# Patient Record
Sex: FEMALE | Race: WHITE | Employment: OTHER
[De-identification: names, ages, dates, MRNs, and addresses within clinical notes are randomized per-mention and may not be internally consistent; named-entity substitution may affect disease eponyms.]

---

## 2017-02-08 ENCOUNTER — TELEPHONE (OUTPATIENT)
Dept: UROLOGY | Facility: CLINIC | Age: 57
End: 2017-02-08

## 2017-02-09 ENCOUNTER — OFFICE VISIT (OUTPATIENT)
Dept: UROLOGY | Facility: CLINIC | Age: 57
End: 2017-02-09

## 2017-02-09 VITALS
TEMPERATURE: 98.2 F | HEIGHT: 57 IN | BODY MASS INDEX: 50.27 KG/M2 | WEIGHT: 233 LBS | HEART RATE: 67 BPM | DIASTOLIC BLOOD PRESSURE: 77 MMHG | SYSTOLIC BLOOD PRESSURE: 127 MMHG

## 2017-02-09 DIAGNOSIS — R35.0 FREQUENCY OF MICTURITION: ICD-10-CM

## 2017-02-09 DIAGNOSIS — N39.46 MIXED STRESS AND URGE URINARY INCONTINENCE: Primary | ICD-10-CM

## 2017-02-09 LAB
BILIRUBIN, POC: NORMAL
BLOOD URINE, POC: NORMAL
CLARITY, POC: CLEAR
COLOR, POC: YELLOW
GLUCOSE URINE, POC: NORMAL
KETONES, POC: NORMAL
LEUKOCYTE EST, POC: NORMAL
NITRITE, POC: NORMAL
PH, POC: 6.5
PROTEIN, POC: NORMAL
SPECIFIC GRAVITY, POC: 1.01
UROBILINOGEN, POC: 0.2

## 2017-02-09 PROCEDURE — 81002 URINALYSIS NONAUTO W/O SCOPE: CPT | Performed by: SPECIALIST

## 2017-02-09 PROCEDURE — 99203 OFFICE O/P NEW LOW 30 MIN: CPT | Performed by: SPECIALIST

## 2017-02-09 RX ORDER — OXYBUTYNIN CHLORIDE 10 MG/1
10 TABLET, EXTENDED RELEASE ORAL 2 TIMES DAILY
Qty: 60 TABLET | Refills: 3 | Status: SHIPPED | OUTPATIENT
Start: 2017-02-09 | End: 2017-02-10 | Stop reason: SDUPTHER

## 2017-02-09 RX ORDER — HYDROCHLOROTHIAZIDE 25 MG/1
25 TABLET ORAL DAILY
COMMUNITY
Start: 2016-12-14

## 2017-02-10 DIAGNOSIS — R35.0 FREQUENCY OF MICTURITION: ICD-10-CM

## 2017-02-10 DIAGNOSIS — N39.46 MIXED STRESS AND URGE URINARY INCONTINENCE: ICD-10-CM

## 2017-02-10 RX ORDER — OXYBUTYNIN CHLORIDE 10 MG/1
10 TABLET, EXTENDED RELEASE ORAL 2 TIMES DAILY
Qty: 60 TABLET | Refills: 3 | Status: SHIPPED | OUTPATIENT
Start: 2017-02-10

## 2017-03-09 ENCOUNTER — OFFICE VISIT (OUTPATIENT)
Dept: UROLOGY | Facility: CLINIC | Age: 57
End: 2017-03-09

## 2017-03-09 VITALS
SYSTOLIC BLOOD PRESSURE: 134 MMHG | HEART RATE: 62 BPM | DIASTOLIC BLOOD PRESSURE: 71 MMHG | HEIGHT: 57 IN | WEIGHT: 241 LBS | BODY MASS INDEX: 52 KG/M2

## 2017-03-09 DIAGNOSIS — N39.41 URGE INCONTINENCE: Primary | ICD-10-CM

## 2017-03-09 DIAGNOSIS — N39.3 SUI (STRESS URINARY INCONTINENCE, FEMALE): ICD-10-CM

## 2017-03-09 DIAGNOSIS — R35.0 URINARY FREQUENCY: ICD-10-CM

## 2017-03-09 DIAGNOSIS — N32.81 OVERACTIVE BLADDER: ICD-10-CM

## 2017-03-09 LAB
BILIRUBIN, POC: NORMAL
BLOOD URINE, POC: NORMAL
CLARITY, POC: CLEAR
COLOR, POC: YELLOW
GLUCOSE URINE, POC: NORMAL
KETONES, POC: NORMAL
LEUKOCYTE EST, POC: NORMAL
NITRITE, POC: NORMAL
PH, POC: NORMAL
PROTEIN, POC: NORMAL
SPECIFIC GRAVITY, POC: 1
UROBILINOGEN, POC: 0.2

## 2017-03-09 PROCEDURE — 99214 OFFICE O/P EST MOD 30 MIN: CPT | Performed by: UROLOGY

## 2017-03-09 PROCEDURE — 81002 URINALYSIS NONAUTO W/O SCOPE: CPT | Performed by: UROLOGY

## 2017-04-05 RX ORDER — ACETAMINOPHEN 325 MG/1
650 TABLET ORAL PRN
COMMUNITY

## 2017-04-06 ENCOUNTER — HOSPITAL ENCOUNTER (OUTPATIENT)
Age: 57
Setting detail: OUTPATIENT SURGERY
Discharge: HOME | End: 2017-04-06
Attending: SPECIALIST | Admitting: SPECIALIST
Payer: COMMERCIAL

## 2017-04-06 VITALS
HEART RATE: 65 BPM | HEIGHT: 57 IN | DIASTOLIC BLOOD PRESSURE: 60 MMHG | WEIGHT: 235 LBS | BODY MASS INDEX: 50.7 KG/M2 | OXYGEN SATURATION: 98 % | SYSTOLIC BLOOD PRESSURE: 124 MMHG | TEMPERATURE: 99 F | RESPIRATION RATE: 16 BRPM

## 2017-04-06 PROCEDURE — 3430000000 HC RX DIAGNOSTIC RADIOPHARMACEUTICAL: Performed by: SPECIALIST

## 2017-04-06 PROCEDURE — 7100000010 HC PHASE II RECOVERY - FIRST 15 MIN: Performed by: SPECIALIST

## 2017-04-06 PROCEDURE — 2500000003 HC RX 250 WO HCPCS: Performed by: SPECIALIST

## 2017-04-06 PROCEDURE — 7100000011 HC PHASE II RECOVERY - ADDTL 15 MIN: Performed by: SPECIALIST

## 2017-04-06 PROCEDURE — 3600000002 HC SURGERY LEVEL 2 BASE: Performed by: SPECIALIST

## 2017-04-06 PROCEDURE — 3600000012 HC SURGERY LEVEL 2 ADDTL 15MIN: Performed by: SPECIALIST

## 2017-04-06 PROCEDURE — 6370000000 HC RX 637 (ALT 250 FOR IP): Performed by: SPECIALIST

## 2017-04-06 RX ORDER — ULTRASOUND COUPLING MEDIUM
GEL (GRAM) TOPICAL PRN
Status: DISCONTINUED | OUTPATIENT
Start: 2017-04-06 | End: 2017-04-06 | Stop reason: HOSPADM

## 2017-04-06 RX ORDER — SODIUM CHLORIDE, SODIUM LACTATE, POTASSIUM CHLORIDE, CALCIUM CHLORIDE 600; 310; 30; 20 MG/100ML; MG/100ML; MG/100ML; MG/100ML
INJECTION, SOLUTION INTRAVENOUS CONTINUOUS
Status: DISCONTINUED | OUTPATIENT
Start: 2017-04-06 | End: 2017-04-06

## 2017-04-06 RX ORDER — WATER 1000 ML/1000ML
INJECTION, SOLUTION INTRAVENOUS PRN
Status: DISCONTINUED | OUTPATIENT
Start: 2017-04-06 | End: 2017-04-06 | Stop reason: HOSPADM

## 2017-04-06 RX ORDER — LIDOCAINE HYDROCHLORIDE 10 MG/ML
1 INJECTION, SOLUTION EPIDURAL; INFILTRATION; INTRACAUDAL; PERINEURAL
Status: DISCONTINUED | OUTPATIENT
Start: 2017-04-06 | End: 2017-04-06

## 2017-04-06 ASSESSMENT — PAIN SCALES - GENERAL: PAINLEVEL_OUTOF10: 0

## 2017-04-06 ASSESSMENT — PAIN - FUNCTIONAL ASSESSMENT: PAIN_FUNCTIONAL_ASSESSMENT: 0-10

## 2017-05-01 ENCOUNTER — TELEPHONE (OUTPATIENT)
Dept: UROLOGY | Age: 57
End: 2017-05-01

## 2017-05-24 ENCOUNTER — TELEPHONE (OUTPATIENT)
Dept: UROLOGY | Age: 57
End: 2017-05-24

## 2017-07-10 ENCOUNTER — TELEPHONE (OUTPATIENT)
Dept: OBGYN CLINIC | Age: 57
End: 2017-07-10

## 2017-07-10 DIAGNOSIS — Z12.31 VISIT FOR SCREENING MAMMOGRAM: Primary | ICD-10-CM

## 2018-01-31 DIAGNOSIS — Z12.31 VISIT FOR SCREENING MAMMOGRAM: Primary | ICD-10-CM

## 2018-11-28 ENCOUNTER — HOSPITAL ENCOUNTER (OUTPATIENT)
Age: 58
Setting detail: SPECIMEN
Discharge: HOME | End: 2018-11-28
Payer: MEDICARE

## 2018-11-28 ENCOUNTER — OFFICE VISIT (OUTPATIENT)
Dept: OBGYN CLINIC | Age: 58
End: 2018-11-28
Payer: MEDICARE

## 2018-11-28 VITALS — RESPIRATION RATE: 20 BRPM | WEIGHT: 249.8 LBS | HEIGHT: 57 IN | BODY MASS INDEX: 53.89 KG/M2

## 2018-11-28 DIAGNOSIS — Z01.419 VISIT FOR GYNECOLOGIC EXAMINATION: Primary | ICD-10-CM

## 2018-11-28 DIAGNOSIS — Z12.72 SCREENING FOR VAGINAL CANCER: ICD-10-CM

## 2018-11-28 DIAGNOSIS — Z90.710 STATUS POST HYSTERECTOMY: ICD-10-CM

## 2018-11-28 PROCEDURE — G0101 CA SCREEN;PELVIC/BREAST EXAM: HCPCS | Performed by: SPECIALIST

## 2018-11-28 PROCEDURE — G8484 FLU IMMUNIZE NO ADMIN: HCPCS | Performed by: SPECIALIST

## 2018-11-28 ASSESSMENT — ENCOUNTER SYMPTOMS
CONSTIPATION: 0
VOMITING: 0
ABDOMINAL PAIN: 0
EYE PAIN: 0
APNEA: 0
NAUSEA: 0
ABDOMINAL DISTENTION: 0
COUGH: 0
DIARRHEA: 0

## 2018-11-28 NOTE — PROGRESS NOTES
 PAP SMEAR     Patient History:    No LMP recorded (approximate). Patient has had a hysterectomy. OBGYN Status: Hysterectomy  Past Surgical History:  1988: APPENDECTOMY  4/6/2017: CYSTOMETROGRAM N/A      Comment: VIDEOURDYNAMICS performed by Mike Harvey MD at [place] [address]  No date: CYSTOSCOPY      Comment: with urodynamics  4/6/2017: CYSTOSCOPY      Comment: CYSTOSCOPY performed by Pan Turner MD at [place] [address]  2009: [address]  02/2016: EYE SURGERY Bilateral      Comment: CATARACTS  2009: HYSTERECTOMY  03/2015: JOINT REPLACEMENT Bilateral      Comment: TOTAL KNEES  2007: KNEE ARTHROSCOPY Right  3/24/2014: REVISION TOTAL KNEE ARTHROPLASTY      Comment: bilateral knee replacement      Smoking status: Never Smoker                                                              Smokeless tobacco: Never Used                           Standing Status:   Future     Standing Expiration Date:   11/28/2019     Order Specific Question:   Collection Type     Answer: Thin Prep     Order Specific Question:   Prior Abnormal Pap Test     Answer:   No     Order Specific Question:   Screening or Diagnostic     Answer:   Screening     Order Specific Question:   HPV Requested? Answer:   Yes     Order Specific Question:   High Risk Patient     Answer:   N/A        Patient is here for her annual exam today. She has had a hysterectomy. Patient states she had a normal mammogram in February this year. She denies any gynecological complaints at this time. Review of Systems   Constitutional: Negative for activity change, appetite change and fever. HENT: Negative for ear discharge and ear pain. Eyes: Negative for pain and visual disturbance. Respiratory: Negative for apnea and cough. Cardiovascular: Negative for chest pain, palpitations and leg swelling.    Gastrointestinal: Negative for abdominal distention, abdominal pain, constipation, diarrhea, nausea

## 2018-12-03 LAB
HPV SAMPLE: NORMAL
HPV SOURCE: NORMAL
HPV, GENOTYPE 16: NOT DETECTED
HPV, GENOTYPE 18: NOT DETECTED
HPV, HIGH RISK OTHER: NOT DETECTED
HPV, INTERPRETATION: NORMAL

## 2018-12-11 LAB — CYTOLOGY REPORT: NORMAL

## 2019-07-30 ENCOUNTER — OFFICE VISIT (OUTPATIENT)
Dept: OBGYN CLINIC | Age: 59
End: 2019-07-30
Payer: MEDICARE

## 2019-07-30 VITALS
WEIGHT: 247 LBS | HEIGHT: 57 IN | HEART RATE: 69 BPM | SYSTOLIC BLOOD PRESSURE: 150 MMHG | DIASTOLIC BLOOD PRESSURE: 82 MMHG | BODY MASS INDEX: 53.29 KG/M2

## 2019-07-30 DIAGNOSIS — R23.2 HOT FLASHES: Primary | ICD-10-CM

## 2019-07-30 DIAGNOSIS — R45.89 MOODINESS: ICD-10-CM

## 2019-07-30 PROCEDURE — G8427 DOCREV CUR MEDS BY ELIG CLIN: HCPCS | Performed by: CLINICAL NURSE SPECIALIST

## 2019-07-30 PROCEDURE — G8417 CALC BMI ABV UP PARAM F/U: HCPCS | Performed by: CLINICAL NURSE SPECIALIST

## 2019-07-30 PROCEDURE — 1036F TOBACCO NON-USER: CPT | Performed by: CLINICAL NURSE SPECIALIST

## 2019-07-30 PROCEDURE — 99213 OFFICE O/P EST LOW 20 MIN: CPT | Performed by: CLINICAL NURSE SPECIALIST

## 2019-07-30 PROCEDURE — 3017F COLORECTAL CA SCREEN DOC REV: CPT | Performed by: CLINICAL NURSE SPECIALIST

## 2019-07-30 RX ORDER — VENLAFAXINE HYDROCHLORIDE 37.5 MG/1
37.5 CAPSULE, EXTENDED RELEASE ORAL DAILY
Qty: 30 CAPSULE | Refills: 0 | Status: SHIPPED | OUTPATIENT
Start: 2019-07-30 | End: 2019-08-20 | Stop reason: SDUPTHER

## 2019-07-30 ASSESSMENT — ENCOUNTER SYMPTOMS
ALLERGIC/IMMUNOLOGIC NEGATIVE: 1
GASTROINTESTINAL NEGATIVE: 1
RESPIRATORY NEGATIVE: 1
EYES NEGATIVE: 1

## 2019-07-30 NOTE — PROGRESS NOTES
Subjective:      Patient ID:  Stef Blas is a 62 y.o. female who presents for   Chief Complaint   Patient presents with    Menopause     Patient is here today to discuss her hot flashes and mood swings. HPI     Patient is a 61 yo female who presents for hot flashes and moodiness. Patient reports that this has been an ongoing problem for the past 1 month but family feels it has been the last 1 1/2 years. Patient reports that her boyfriend states she snaps at him all the time. Mostly night sweats every night and sleeps with fan. Review of Systems   Constitutional: Negative for chills and fever. HENT: Negative. Eyes: Negative. Respiratory: Negative. Cardiovascular: Negative. Gastrointestinal: Negative. Endocrine:        Night sweat that have increased   Musculoskeletal: Negative. Skin: Negative. Allergic/Immunologic: Negative. Neurological: Negative. Hematological: Negative. Psychiatric/Behavioral: Negative. Patient states that she snaps at her boyfriend and her kids all the time. Night sweats and she uses a fan     BP (!) 150/82 (Site: Right Lower Arm, Position: Sitting, Cuff Size: Medium Adult)   Pulse 69   Ht 4' 9\" (1.448 m)   Wt 247 lb (112 kg)   LMP  (Approximate)   BMI 53.45 kg/m²    No LMP recorded (approximate). Patient has had a hysterectomy. Objective:   Physical Exam   Constitutional: She is oriented to person, place, and time. She appears well-developed and well-nourished. HENT:   Head: Normocephalic and atraumatic. Eyes: Conjunctivae are normal.   Neck: Normal range of motion. Neck supple. Cardiovascular: Normal rate and regular rhythm. Pulmonary/Chest: Effort normal and breath sounds normal.   Abdominal: Bowel sounds are normal.   Musculoskeletal: Normal range of motion. Neurological: She is oriented to person, place, and time. Skin: Skin is warm and dry. Psychiatric: She has a normal mood and affect.  Her behavior is

## 2019-08-20 ENCOUNTER — OFFICE VISIT (OUTPATIENT)
Dept: OBGYN CLINIC | Age: 59
End: 2019-08-20
Payer: MEDICARE

## 2019-08-20 VITALS
WEIGHT: 240 LBS | RESPIRATION RATE: 18 BRPM | BODY MASS INDEX: 51.78 KG/M2 | TEMPERATURE: 96.9 F | SYSTOLIC BLOOD PRESSURE: 121 MMHG | DIASTOLIC BLOOD PRESSURE: 73 MMHG | HEART RATE: 71 BPM | HEIGHT: 57 IN

## 2019-08-20 DIAGNOSIS — R23.2 HOT FLASHES: Primary | ICD-10-CM

## 2019-08-20 DIAGNOSIS — R45.89 MOODINESS: ICD-10-CM

## 2019-08-20 DIAGNOSIS — N95.1 MENOPAUSAL SYMPTOMS: ICD-10-CM

## 2019-08-20 PROCEDURE — 99213 OFFICE O/P EST LOW 20 MIN: CPT | Performed by: CLINICAL NURSE SPECIALIST

## 2019-08-20 PROCEDURE — G8427 DOCREV CUR MEDS BY ELIG CLIN: HCPCS | Performed by: CLINICAL NURSE SPECIALIST

## 2019-08-20 PROCEDURE — 1036F TOBACCO NON-USER: CPT | Performed by: CLINICAL NURSE SPECIALIST

## 2019-08-20 PROCEDURE — 3017F COLORECTAL CA SCREEN DOC REV: CPT | Performed by: CLINICAL NURSE SPECIALIST

## 2019-08-20 PROCEDURE — G8417 CALC BMI ABV UP PARAM F/U: HCPCS | Performed by: CLINICAL NURSE SPECIALIST

## 2019-08-20 RX ORDER — VENLAFAXINE HYDROCHLORIDE 37.5 MG/1
37.5 CAPSULE, EXTENDED RELEASE ORAL DAILY
Qty: 30 CAPSULE | Refills: 2 | Status: SHIPPED | OUTPATIENT
Start: 2019-08-20 | End: 2020-03-04 | Stop reason: SDUPTHER

## 2019-08-20 ASSESSMENT — ENCOUNTER SYMPTOMS
ALLERGIC/IMMUNOLOGIC NEGATIVE: 1
RESPIRATORY NEGATIVE: 1
GASTROINTESTINAL NEGATIVE: 1
EYES NEGATIVE: 1

## 2019-08-20 NOTE — PROGRESS NOTES
Subjective:      Patient ID:  Arnie Gottron is a 62 y.o. female who presents for   Chief Complaint   Patient presents with    Follow-up     Pt present today for 3 wk f/u medication check Effexor XR 37.5 mg. Pt states she feels the medication is effective. Pt denies any other concerns at this time. HPI     Patient is a 63 yo female who presents for 3 week follow up on hot flashes and moodiness. Patient reports that her hot flashes are approx. 50 % better and her moodiness has greatly improved. Patient states that she is not snapping at family anymore and is joking with family. Patient reports that even her family has noticed a difference. Review of Systems   Constitutional: Negative for chills and fever. HENT: Negative. Eyes: Negative. Respiratory: Negative. Cardiovascular: Negative. Gastrointestinal: Negative. Endocrine: Negative. Hot flashes are 50% improved    Musculoskeletal: Negative. Skin: Negative. Allergic/Immunologic: Negative. Neurological: Negative. Hematological: Negative. Psychiatric/Behavioral: Negative. Improved mood and is able to joke around with family     /73 (Site: Right Upper Arm, Position: Sitting, Cuff Size: Large Adult)   Pulse 71   Temp 96.9 °F (36.1 °C) (Oral)   Resp 18   Ht 4' 9\" (1.448 m)   Wt 240 lb (108.9 kg)   LMP  (Approximate)   BMI 51.94 kg/m²    No LMP recorded (approximate). Patient has had a hysterectomy. Objective:   Physical Exam   Constitutional: She is oriented to person, place, and time. She appears well-developed and well-nourished. HENT:   Head: Normocephalic and atraumatic. Eyes: Conjunctivae are normal.   Neck: Normal range of motion. Neck supple. Cardiovascular: Normal rate and regular rhythm. Pulmonary/Chest: Effort normal and breath sounds normal.   Abdominal: Bowel sounds are normal.   Musculoskeletal: Normal range of motion.    Neurological: She is oriented to person, place, and time.   Skin: Skin is warm and dry. Psychiatric: She has a normal mood and affect. Her behavior is normal. Judgment and thought content normal.   Much brighter affect, she is joking in room and smiling   Vitals reviewed. Assessment:      Diagnosis Orders   1. Hot flashes  venlafaxine (EFFEXOR XR) 37.5 MG extended release capsule   2. Moodiness  venlafaxine (EFFEXOR XR) 37.5 MG extended release capsule   3. Menopausal symptoms             Plan:    patient would like to remain on effexor, she was instructed if any changes to follow up sooner than 3 months and patient verbalized understanding. Return in about 3 months (around 11/20/2019) for med ck. Patient was seen with total face to face time of 15 minutes. More than 50% of this visit was on counseling andeducation regarding the problems listed below and her options. She was also counseled on her preventative health maintenance recommendations and follow-up.     Electronically signed by: Allen Baker CNP

## 2019-12-02 ENCOUNTER — HOSPITAL ENCOUNTER (OUTPATIENT)
Age: 59
Setting detail: SPECIMEN
Discharge: HOME | End: 2019-12-02
Payer: MEDICARE

## 2019-12-02 ENCOUNTER — OFFICE VISIT (OUTPATIENT)
Dept: OBGYN CLINIC | Age: 59
End: 2019-12-02

## 2019-12-02 VITALS
SYSTOLIC BLOOD PRESSURE: 115 MMHG | HEART RATE: 66 BPM | BODY MASS INDEX: 54.58 KG/M2 | WEIGHT: 253 LBS | DIASTOLIC BLOOD PRESSURE: 51 MMHG | HEIGHT: 57 IN

## 2019-12-02 DIAGNOSIS — Z12.39 SCREENING FOR BREAST CANCER: ICD-10-CM

## 2019-12-02 DIAGNOSIS — Z01.419 WELL WOMAN EXAM: Primary | ICD-10-CM

## 2019-12-02 DIAGNOSIS — Z90.710 STATUS POST HYSTERECTOMY: ICD-10-CM

## 2019-12-02 DIAGNOSIS — N76.0 ACUTE VAGINITIS: ICD-10-CM

## 2019-12-02 PROCEDURE — G8484 FLU IMMUNIZE NO ADMIN: HCPCS | Performed by: SPECIALIST

## 2019-12-02 PROCEDURE — G0101 CA SCREEN;PELVIC/BREAST EXAM: HCPCS | Performed by: SPECIALIST

## 2019-12-02 RX ORDER — METRONIDAZOLE 500 MG/1
500 TABLET ORAL 2 TIMES DAILY
Qty: 14 TABLET | Refills: 0 | Status: SHIPPED | OUTPATIENT
Start: 2019-12-02 | End: 2019-12-09

## 2019-12-02 RX ORDER — FLUCONAZOLE 100 MG/1
100 TABLET ORAL DAILY
Qty: 7 TABLET | Refills: 0 | Status: SHIPPED | OUTPATIENT
Start: 2019-12-02 | End: 2019-12-09

## 2019-12-02 ASSESSMENT — ENCOUNTER SYMPTOMS
VOMITING: 0
NAUSEA: 0
COUGH: 0
DIARRHEA: 0
CONSTIPATION: 0
ABDOMINAL PAIN: 0
ABDOMINAL DISTENTION: 0
APNEA: 0
EYE PAIN: 0

## 2019-12-04 LAB
HPV SAMPLE: NORMAL
HPV, GENOTYPE 16: NOT DETECTED
HPV, GENOTYPE 18: NOT DETECTED
HPV, HIGH RISK OTHER: NOT DETECTED
HPV, INTERPRETATION: NORMAL
SPECIMEN DESCRIPTION: NORMAL

## 2019-12-05 LAB — CYTOLOGY REPORT: NORMAL

## 2020-01-01 PROBLEM — Z12.39 SCREENING FOR BREAST CANCER: Status: RESOLVED | Noted: 2019-12-02 | Resolved: 2020-01-01

## 2020-03-05 RX ORDER — VENLAFAXINE HYDROCHLORIDE 37.5 MG/1
37.5 CAPSULE, EXTENDED RELEASE ORAL DAILY
Qty: 90 CAPSULE | Refills: 2 | Status: SHIPPED | OUTPATIENT
Start: 2020-03-05

## 2021-05-15 DIAGNOSIS — R23.2 HOT FLASHES: ICD-10-CM

## 2021-05-15 DIAGNOSIS — R45.89 MOODINESS: ICD-10-CM

## 2021-05-17 RX ORDER — VENLAFAXINE HYDROCHLORIDE 37.5 MG/1
CAPSULE, EXTENDED RELEASE ORAL
Qty: 90 CAPSULE | Refills: 3 | OUTPATIENT
Start: 2021-05-17

## 2022-08-04 ENCOUNTER — OFFICE VISIT (OUTPATIENT)
Dept: OBGYN CLINIC | Age: 62
End: 2022-08-04
Payer: MEDICARE

## 2022-08-04 VITALS
SYSTOLIC BLOOD PRESSURE: 118 MMHG | DIASTOLIC BLOOD PRESSURE: 84 MMHG | HEART RATE: 78 BPM | BODY MASS INDEX: 55.44 KG/M2 | WEIGHT: 257 LBS | HEIGHT: 57 IN

## 2022-08-04 DIAGNOSIS — Z12.31 SCREENING MAMMOGRAM FOR BREAST CANCER: ICD-10-CM

## 2022-08-04 DIAGNOSIS — R21 SKIN RASH: ICD-10-CM

## 2022-08-04 DIAGNOSIS — Z01.419 WELL WOMAN EXAM WITH ROUTINE GYNECOLOGICAL EXAM: Primary | ICD-10-CM

## 2022-08-04 PROCEDURE — G8427 DOCREV CUR MEDS BY ELIG CLIN: HCPCS | Performed by: CLINICAL NURSE SPECIALIST

## 2022-08-04 PROCEDURE — G0101 CA SCREEN;PELVIC/BREAST EXAM: HCPCS | Performed by: CLINICAL NURSE SPECIALIST

## 2022-08-04 PROCEDURE — G8417 CALC BMI ABV UP PARAM F/U: HCPCS | Performed by: CLINICAL NURSE SPECIALIST

## 2022-08-04 ASSESSMENT — PATIENT HEALTH QUESTIONNAIRE - PHQ9
SUM OF ALL RESPONSES TO PHQ QUESTIONS 1-9: 0
SUM OF ALL RESPONSES TO PHQ QUESTIONS 1-9: 0
1. LITTLE INTEREST OR PLEASURE IN DOING THINGS: 0
SUM OF ALL RESPONSES TO PHQ9 QUESTIONS 1 & 2: 0
SUM OF ALL RESPONSES TO PHQ QUESTIONS 1-9: 0
SUM OF ALL RESPONSES TO PHQ QUESTIONS 1-9: 0
2. FEELING DOWN, DEPRESSED OR HOPELESS: 0

## 2022-08-04 NOTE — PROGRESS NOTES
[facility]  [address] [facility]  [address] [facility] [address]  Dept: [phone]        DATE OF VISIT:  22        History and Physical    Darcy Arroyo    :  1960  CHIEF COMPLAINT:    Chief Complaint   Patient presents with    Annual Exam                    Darcy Arroyo is a 57 y.o. female who presents for annual well woman exam.      The patient was seen and examined. Per the patient bowels areregular. She has no voiding complaints. She denies any bloating as well as vaginal discharge. Chaperone for Intimate Exam  Chaperone was offered as part of the rooming process. Patient declined and agrees to continue with exam without a chaperone.   Chaperone: none     _____________________________________________________________________  Past Medical History:   Diagnosis Date    Adjustment disorder with mixed anxiety and depressed mood 3/6/2015    Arthritis     BILATERAL KNEES AND HANDS    Atrial fibrillation ([facility] [address] 2015    \"stress induced\"  (Dr. Mara Ramos)    Cataracts, bilateral     H/O vitamin D deficiency     Hyperlipidemia     Hypertension     Thyroid disease     Urinary incontinence     Urinary urgency     Wears glasses     READING                                                                   Past Surgical History:   Procedure Laterality Date    APPENDECTOMY  1988    CYSTOMETROGRAM N/A 2017    VIDEOURDYNAMICS performed by Jessica Smart MD at [address]      with urodynamics    CYSTOSCOPY  2017    CYSTOSCOPY performed by Jessica Smart MD at [address] [facility]  2009    EYE SURGERY Bilateral 2016    CATARACTS    HYSTERECTOMY (CERVIX STATUS UNKNOWN)  2009    JOINT REPLACEMENT Bilateral 2015    TOTAL KNEES    KNEE ARTHROSCOPY Right 2007    REVISION TOTAL KNEE ARTHROPLASTY  3/24/2014    bilateral knee replacement     Family History   Problem Relation Age of Onset Heart Disease Mother     High Blood Pressure Mother     Cancer Father         prostate, renal    High Blood Pressure Father     Cancer Sister         leukemia, uterine    Hypertension Sister         several sisters    Other Sister         brain aneurysm    Hypertension Brother         several brothers    Heart Disease Maternal Grandfather     Asthma Daughter     Cancer Maternal Aunt         breast     Social History     Tobacco Use   Smoking Status Never   Smokeless Tobacco Never     Social History     Substance and Sexual Activity   Alcohol Use No     Current Outpatient Medications   Medication Sig Dispense Refill    nystatin-triamcinolone (MYCOLOG II) 740145-8.1 UNIT/GM-% cream Apply sparingly bid 30 g 1    venlafaxine (EFFEXOR XR) 37.5 MG extended release capsule Take 1 capsule by mouth daily 90 capsule 2    acetaminophen (TYLENOL) 325 MG tablet Take 650 mg by mouth as needed for Pain      oxybutynin (DITROPAN-XL) 10 MG extended release tablet Take 1 tablet by mouth 2 times daily 60 tablet 3    hydrochlorothiazide (HYDRODIURIL) 25 MG tablet 25 mg daily      amiodarone (PACERONE) 100 MG tablet Take 200 mg by mouth daily       apixaban (ELIQUIS) 5 MG TABS tablet Take by mouth 2 times daily      levothyroxine (SYNTHROID) 25 MCG tablet Take 25 mcg by mouth Daily       No current facility-administered medications for this visit. Allergies: Allergies   Allergen Reactions    Seasonal Other (See Comments)     ITCHY EYES, SNEEZING       Gynecologic History:  No LMP recorded. Patient has had a hysterectomy.   Sexually Active: Yes  STD History:No  Birth Control: No    OB History   No obstetric history on file.     ______________________________________________________________________    Review of Systems    REVIEW OFSYSTEMS:        Constitutional:  Unexpected weight change, extreme fatigue, night sweats              no  Skin:                           Rashes, moles   no  Neurological:  Frequentheadaches, seizures         no  Ophthalmic:  Recent visual changes no  ENT:   Difficulty swallowing  no  Breast:              Masses, pain, nipple discharge                            no     Respiratory:  Shortness of breath, coughing           no    Cardiovascular: Chest pain   no     Gastrointestinal: Chronic diarrhea/constipation, nausea/vomiting           no   Urogenital:  Urinary incontinence when coughing laughing or sneezing, frequency, urgency          yes                                         Heavy/irregular periods           HYST                                      Vaginal discharge                   no  Hematological: Bruises easy Denies clotting disorder  yes     Endocrine:  Hot flashes   no     Hot/Cold Intolerance  no    Psychological:            Mood and affect were within normal limits. Mood swings and does not feels she needs meds  yes                 Physical Exam    Physical Exam:    Vitals:    08/04/22 0921   BP: 118/84   Site: Right Upper Arm   Position: Sitting   Cuff Size: Large Adult   Pulse: 78   Weight: 257 lb (116.6 kg)   Height: 4' 9\" (1.448 m)       General Appearance: This  is a well developed, well nourished, well groomed female. Her BMI was reviewed. Nutritional decision making andexercise were discussed. Neurological:  The patient is alert and oriented to time,place, person, and situation    Skin:  A brief inspection of the skin revealed no rashes or lesions. Neck:  The neck was supple. Respiratory: There was unlabored respiratory effort. Lungs clear to ascultation. Cardiovascular: The patients extremities were without calf tenderness or edema. Heart with a regular rate and rhythm. Abdomen: The abdomen was soft and non-tender with no guarding, rebound or rigidity. No hernias were appreciated. Breast:   The patients breasts were symmetrical.  There were no masses, discharge or retractions noted. Self breast exams were reviewed.     Pelvic Exam:  External genitalia: normal general appearance, sparse hair  Urinary system: urethral meatus normal  Vaginal: normal mucosa without lesions. Cervix: absent  Adnexa: non palpable  Uterus: absent  Rectal Exam: exam declined by patient        ASSESSMENT: Normal annual well woman exam    57 y.o. Female; Annual   Diagnosis Orders   1. Well woman exam with routine gynecological exam  SUSSY DIGITAL SCREEN W OR WO CAD BILATERAL      2. Screening mammogram for breast cancer  SUSSY DIGITAL SCREEN W OR WO CAD BILATERAL      3. Skin rash  nystatin-triamcinolone (MYCOLOG II) 732342-5.1 UNIT/GM-% cream                        PLAN:  - Discussed new papsmear guidelines. - Discussed with patient need for 1200 mg calcium along with Vit. D3 800 IU daily, recent osteopenia dx   - Smoking risk factors Discussed  - Diet and exercise reviewed. - Routine healthmaintenance per patients PCP.  - Return to clinic in 1 year or earlier with questions, problems, concerns. Return for 1 year for Annual and as needed.         Electronically signed by EMANI Bahena CNP on 8/4/2022 at 9:53 AM

## 2024-04-30 ENCOUNTER — OFFICE VISIT (OUTPATIENT)
Dept: FAMILY MEDICINE CLINIC | Age: 64
End: 2024-04-30
Payer: MEDICARE

## 2024-04-30 ENCOUNTER — HOSPITAL ENCOUNTER (OUTPATIENT)
Age: 64
Discharge: HOME | End: 2024-04-30
Payer: MEDICARE

## 2024-04-30 VITALS
SYSTOLIC BLOOD PRESSURE: 146 MMHG | BODY MASS INDEX: 61.92 KG/M2 | WEIGHT: 287 LBS | HEIGHT: 57 IN | HEART RATE: 63 BPM | OXYGEN SATURATION: 96 % | DIASTOLIC BLOOD PRESSURE: 90 MMHG | TEMPERATURE: 97.7 F

## 2024-04-30 DIAGNOSIS — E03.9 HYPOTHYROIDISM, UNSPECIFIED TYPE: ICD-10-CM

## 2024-04-30 DIAGNOSIS — R53.83 OTHER FATIGUE: ICD-10-CM

## 2024-04-30 DIAGNOSIS — M54.50 CHRONIC MIDLINE LOW BACK PAIN WITHOUT SCIATICA: ICD-10-CM

## 2024-04-30 DIAGNOSIS — Z11.59 NEED FOR HEPATITIS C SCREENING TEST: ICD-10-CM

## 2024-04-30 DIAGNOSIS — I10 ESSENTIAL HYPERTENSION: ICD-10-CM

## 2024-04-30 DIAGNOSIS — M79.89 FOOT SWELLING: ICD-10-CM

## 2024-04-30 DIAGNOSIS — Z78.0 POST-MENOPAUSAL: ICD-10-CM

## 2024-04-30 DIAGNOSIS — E55.9 VITAMIN D DEFICIENCY: ICD-10-CM

## 2024-04-30 DIAGNOSIS — G89.29 CHRONIC MIDLINE LOW BACK PAIN WITHOUT SCIATICA: ICD-10-CM

## 2024-04-30 DIAGNOSIS — Z76.89 ENCOUNTER TO ESTABLISH CARE: ICD-10-CM

## 2024-04-30 DIAGNOSIS — R06.00 DYSPNEA, UNSPECIFIED TYPE: ICD-10-CM

## 2024-04-30 DIAGNOSIS — R73.9 HYPERGLYCEMIA: ICD-10-CM

## 2024-04-30 DIAGNOSIS — I10 ESSENTIAL HYPERTENSION: Primary | ICD-10-CM

## 2024-04-30 DIAGNOSIS — E78.2 MIXED HYPERLIPIDEMIA: ICD-10-CM

## 2024-04-30 DIAGNOSIS — Z12.31 ENCOUNTER FOR SCREENING MAMMOGRAM FOR MALIGNANT NEOPLASM OF BREAST: ICD-10-CM

## 2024-04-30 DIAGNOSIS — I48.91 ATRIAL FIBRILLATION WITH RAPID VENTRICULAR RESPONSE (HCC): ICD-10-CM

## 2024-04-30 DIAGNOSIS — L60.2 THICKENED NAILS: ICD-10-CM

## 2024-04-30 DIAGNOSIS — Z11.4 SCREENING FOR HIV (HUMAN IMMUNODEFICIENCY VIRUS): ICD-10-CM

## 2024-04-30 DIAGNOSIS — Z12.11 SCREENING FOR COLON CANCER: ICD-10-CM

## 2024-04-30 PROBLEM — G47.30 SLEEP APNEA: Status: ACTIVE | Noted: 2024-04-22

## 2024-04-30 PROBLEM — M17.9 OSTEOARTHRITIS OF KNEE: Status: ACTIVE | Noted: 2017-07-31

## 2024-04-30 PROBLEM — F32.A DEPRESSION: Status: ACTIVE | Noted: 2017-07-31

## 2024-04-30 PROBLEM — R07.9 CHEST PAIN: Status: ACTIVE | Noted: 2023-07-04

## 2024-04-30 PROBLEM — J34.2 NASAL SEPTAL DEVIATION: Status: ACTIVE | Noted: 2018-05-05

## 2024-04-30 PROBLEM — J34.89 NASAL SEPTAL PERFORATION: Status: ACTIVE | Noted: 2018-05-05

## 2024-04-30 PROBLEM — E66.01 MORBID OBESITY (HCC): Status: ACTIVE | Noted: 2017-07-31

## 2024-04-30 LAB
25(OH)D3 SERPL-MCNC: 18.5 NG/ML (ref 30–100)
ALBUMIN SERPL-MCNC: 4.2 G/DL (ref 3.5–5.2)
ALP SERPL-CCNC: 70 U/L (ref 35–104)
ALT SERPL-CCNC: 11 U/L (ref 5–33)
ANION GAP SERPL CALCULATED.3IONS-SCNC: 11 MMOL/L (ref 9–17)
AST SERPL-CCNC: 16 U/L
BILIRUB SERPL-MCNC: 0.4 MG/DL (ref 0.3–1.2)
BILIRUB UR QL STRIP: NEGATIVE
BNP SERPL-MCNC: 312 PG/ML
BUN SERPL-MCNC: 20 MG/DL (ref 8–23)
CALCIUM SERPL-MCNC: 9.6 MG/DL (ref 8.6–10.4)
CHLORIDE SERPL-SCNC: 104 MMOL/L (ref 98–107)
CHOLEST SERPL-MCNC: 202 MG/DL
CHOLESTEROL/HDL RATIO: 3.7
CLARITY UR: CLEAR
CO2 SERPL-SCNC: 26 MMOL/L (ref 20–31)
COLOR UR: YELLOW
COMMENT: NORMAL
CREAT SERPL-MCNC: 1.2 MG/DL (ref 0.5–0.9)
ERYTHROCYTE [DISTWIDTH] IN BLOOD BY AUTOMATED COUNT: 14.9 % (ref 11.5–14.9)
EST. AVERAGE GLUCOSE BLD GHB EST-MCNC: 91 MG/DL
FOLATE SERPL-MCNC: 17.1 NG/ML (ref 4.8–24.2)
GFR SERPL CREATININE-BSD FRML MDRD: 50 ML/MIN/1.73M2
GLUCOSE SERPL-MCNC: 98 MG/DL (ref 70–99)
GLUCOSE UR STRIP-MCNC: NEGATIVE MG/DL
HBA1C MFR BLD: 4.8 % (ref 4–6)
HCT VFR BLD AUTO: 38.3 % (ref 36–46)
HCV AB SERPL QL IA: NONREACTIVE
HDLC SERPL-MCNC: 55 MG/DL
HGB BLD-MCNC: 12.5 G/DL (ref 12–16)
HGB UR QL STRIP.AUTO: NEGATIVE
HIV 1+2 AB+HIV1 P24 AG SERPL QL IA: NONREACTIVE
KETONES UR STRIP-MCNC: NEGATIVE MG/DL
LDLC SERPL CALC-MCNC: 130 MG/DL (ref 0–130)
LEUKOCYTE ESTERASE UR QL STRIP: NEGATIVE
MAGNESIUM SERPL-MCNC: 2.3 MG/DL (ref 1.6–2.6)
MCH RBC QN AUTO: 29.8 PG (ref 26–34)
MCHC RBC AUTO-ENTMCNC: 32.8 G/DL (ref 31–37)
MCV RBC AUTO: 90.9 FL (ref 80–100)
NITRITE UR QL STRIP: NEGATIVE
PH UR STRIP: 6.5 [PH] (ref 5–8)
PLATELET # BLD AUTO: 221 K/UL (ref 150–450)
PMV BLD AUTO: 7.9 FL (ref 6–12)
POTASSIUM SERPL-SCNC: 4.8 MMOL/L (ref 3.7–5.3)
PROT SERPL-MCNC: 7.2 G/DL (ref 6.4–8.3)
PROT UR STRIP-MCNC: NEGATIVE MG/DL
RBC # BLD AUTO: 4.21 M/UL (ref 4–5.2)
SODIUM SERPL-SCNC: 141 MMOL/L (ref 135–144)
SP GR UR STRIP: 1.02 (ref 1–1.03)
T4 FREE SERPL-MCNC: 1.9 NG/DL (ref 0.9–1.7)
TRIGL SERPL-MCNC: 83 MG/DL
TSH SERPL DL<=0.05 MIU/L-ACNC: 1.6 UIU/ML (ref 0.3–5)
UROBILINOGEN UR STRIP-ACNC: NORMAL EU/DL (ref 0–1)
VIT B12 SERPL-MCNC: 331 PG/ML (ref 232–1245)
WBC OTHER # BLD: 5.5 K/UL (ref 3.5–11)

## 2024-04-30 PROCEDURE — G8400 PT W/DXA NO RESULTS DOC: HCPCS | Performed by: NURSE PRACTITIONER

## 2024-04-30 PROCEDURE — 85027 COMPLETE CBC AUTOMATED: CPT

## 2024-04-30 PROCEDURE — 83880 ASSAY OF NATRIURETIC PEPTIDE: CPT

## 2024-04-30 PROCEDURE — 82746 ASSAY OF FOLIC ACID SERUM: CPT

## 2024-04-30 PROCEDURE — 81003 URINALYSIS AUTO W/O SCOPE: CPT

## 2024-04-30 PROCEDURE — 36415 COLL VENOUS BLD VENIPUNCTURE: CPT

## 2024-04-30 PROCEDURE — 82306 VITAMIN D 25 HYDROXY: CPT

## 2024-04-30 PROCEDURE — 84443 ASSAY THYROID STIM HORMONE: CPT

## 2024-04-30 PROCEDURE — 86803 HEPATITIS C AB TEST: CPT

## 2024-04-30 PROCEDURE — 99205 OFFICE O/P NEW HI 60 MIN: CPT | Performed by: NURSE PRACTITIONER

## 2024-04-30 PROCEDURE — 80061 LIPID PANEL: CPT

## 2024-04-30 PROCEDURE — 3080F DIAST BP >= 90 MM HG: CPT | Performed by: NURSE PRACTITIONER

## 2024-04-30 PROCEDURE — 83735 ASSAY OF MAGNESIUM: CPT

## 2024-04-30 PROCEDURE — G8427 DOCREV CUR MEDS BY ELIG CLIN: HCPCS | Performed by: NURSE PRACTITIONER

## 2024-04-30 PROCEDURE — 87389 HIV-1 AG W/HIV-1&-2 AB AG IA: CPT

## 2024-04-30 PROCEDURE — 82607 VITAMIN B-12: CPT

## 2024-04-30 PROCEDURE — 3077F SYST BP >= 140 MM HG: CPT | Performed by: NURSE PRACTITIONER

## 2024-04-30 PROCEDURE — 3017F COLORECTAL CA SCREEN DOC REV: CPT | Performed by: NURSE PRACTITIONER

## 2024-04-30 PROCEDURE — 1090F PRES/ABSN URINE INCON ASSESS: CPT | Performed by: NURSE PRACTITIONER

## 2024-04-30 PROCEDURE — G8417 CALC BMI ABV UP PARAM F/U: HCPCS | Performed by: NURSE PRACTITIONER

## 2024-04-30 PROCEDURE — 84439 ASSAY OF FREE THYROXINE: CPT

## 2024-04-30 PROCEDURE — 80053 COMPREHEN METABOLIC PANEL: CPT

## 2024-04-30 PROCEDURE — 1123F ACP DISCUSS/DSCN MKR DOCD: CPT | Performed by: NURSE PRACTITIONER

## 2024-04-30 PROCEDURE — 1036F TOBACCO NON-USER: CPT | Performed by: NURSE PRACTITIONER

## 2024-04-30 PROCEDURE — 83036 HEMOGLOBIN GLYCOSYLATED A1C: CPT

## 2024-04-30 SDOH — ECONOMIC STABILITY: FOOD INSECURITY: WITHIN THE PAST 12 MONTHS, YOU WORRIED THAT YOUR FOOD WOULD RUN OUT BEFORE YOU GOT MONEY TO BUY MORE.: NEVER TRUE

## 2024-04-30 SDOH — ECONOMIC STABILITY: HOUSING INSECURITY
IN THE LAST 12 MONTHS, WAS THERE A TIME WHEN YOU DID NOT HAVE A STEADY PLACE TO SLEEP OR SLEPT IN A SHELTER (INCLUDING NOW)?: NO

## 2024-04-30 SDOH — ECONOMIC STABILITY: FOOD INSECURITY: WITHIN THE PAST 12 MONTHS, THE FOOD YOU BOUGHT JUST DIDN'T LAST AND YOU DIDN'T HAVE MONEY TO GET MORE.: NEVER TRUE

## 2024-04-30 SDOH — ECONOMIC STABILITY: INCOME INSECURITY: HOW HARD IS IT FOR YOU TO PAY FOR THE VERY BASICS LIKE FOOD, HOUSING, MEDICAL CARE, AND HEATING?: NOT HARD AT ALL

## 2024-04-30 ASSESSMENT — PATIENT HEALTH QUESTIONNAIRE - PHQ9
1. LITTLE INTEREST OR PLEASURE IN DOING THINGS: NOT AT ALL
10. IF YOU CHECKED OFF ANY PROBLEMS, HOW DIFFICULT HAVE THESE PROBLEMS MADE IT FOR YOU TO DO YOUR WORK, TAKE CARE OF THINGS AT HOME, OR GET ALONG WITH OTHER PEOPLE: SOMEWHAT DIFFICULT
SUM OF ALL RESPONSES TO PHQ QUESTIONS 1-9: 3
6. FEELING BAD ABOUT YOURSELF - OR THAT YOU ARE A FAILURE OR HAVE LET YOURSELF OR YOUR FAMILY DOWN: NOT AT ALL
7. TROUBLE CONCENTRATING ON THINGS, SUCH AS READING THE NEWSPAPER OR WATCHING TELEVISION: NOT AT ALL
3. TROUBLE FALLING OR STAYING ASLEEP: NOT AT ALL
9. THOUGHTS THAT YOU WOULD BE BETTER OFF DEAD, OR OF HURTING YOURSELF: NOT AT ALL
SUM OF ALL RESPONSES TO PHQ9 QUESTIONS 1 & 2: 2
SUM OF ALL RESPONSES TO PHQ QUESTIONS 1-9: 3
8. MOVING OR SPEAKING SO SLOWLY THAT OTHER PEOPLE COULD HAVE NOTICED. OR THE OPPOSITE, BEING SO FIGETY OR RESTLESS THAT YOU HAVE BEEN MOVING AROUND A LOT MORE THAN USUAL: NOT AT ALL
5. POOR APPETITE OR OVEREATING: NOT AT ALL
SUM OF ALL RESPONSES TO PHQ QUESTIONS 1-9: 3
SUM OF ALL RESPONSES TO PHQ QUESTIONS 1-9: 3
4. FEELING TIRED OR HAVING LITTLE ENERGY: SEVERAL DAYS
2. FEELING DOWN, DEPRESSED OR HOPELESS: MORE THAN HALF THE DAYS

## 2024-04-30 ASSESSMENT — ENCOUNTER SYMPTOMS
SHORTNESS OF BREATH: 0
BLOOD IN STOOL: 0
BACK PAIN: 0
VOMITING: 0
DIARRHEA: 0
CHEST TIGHTNESS: 0
COUGH: 0
ABDOMINAL DISTENTION: 0
CONSTIPATION: 0
WHEEZING: 0
NAUSEA: 0
ABDOMINAL PAIN: 0

## 2024-04-30 NOTE — PROGRESS NOTES
Visit Information    Have you changed or started any medications since your last visit including any over-the-counter medicines, vitamins, or herbal medicines? no   Have you stopped taking any of your medications? Is so, why? -  no  Are you having any side effects from any of your medications? - no    Have you seen any other physician or provider since your last visit?  no   Have you had any other diagnostic tests since your last visit?  no   Have you been seen in the emergency room and/or had an admission in a hospital since we last saw you?  no   Have you had your routine dental cleaning in the past 6 months?  no     Do you have an active MyChart account? If no, what is the barrier?  No:     Patient Care Team:  Seymour Corado APRN - CNP as PCP - General (Nurse Practitioner)    Medical History Review  Past Medical, Family, and Social History reviewed and does contribute to the patient presenting condition    Health Maintenance   Topic Date Due    HIV screen  Never done    Hepatitis C screen  Never done    Diabetes screen  Never done    Lipids  Never done    Colorectal Cancer Screen  Never done    Respiratory Syncytial Virus (RSV) Pregnant or age 60 yrs+ (1 - 1-dose 60+ series) Never done    Pneumococcal 65+ years Vaccine (2 of 2 - PCV) 06/23/2023    Depression Monitoring  08/04/2023    Annual Wellness Visit (Medicare)  Never done    Breast cancer screen  11/08/2025    DTaP/Tdap/Td vaccine (2 - Td or Tdap) 05/06/2032    DEXA (modify frequency per FRAX score)  Completed    Flu vaccine  Completed    Shingles vaccine  Completed    COVID-19 Vaccine  Completed    Hepatitis A vaccine  Aged Out    Hepatitis B vaccine  Aged Out    Hib vaccine  Aged Out    Polio vaccine  Aged Out    Meningococcal (ACWY) vaccine  Aged Out    Depression Screen  Discontinued    Pneumococcal 0-64 years Vaccine  Discontinued    Cervical cancer screen  Discontinued             
chest tightness, shortness of breath and wheezing.    Cardiovascular:  Positive for leg swelling. Negative for chest pain and palpitations.   Gastrointestinal:  Negative for abdominal distention, abdominal pain, blood in stool, constipation, diarrhea, nausea and vomiting.   Endocrine: Negative for cold intolerance, heat intolerance, polydipsia, polyphagia and polyuria.   Genitourinary:  Negative for difficulty urinating, dysuria, frequency, urgency and vaginal pain.   Musculoskeletal:  Negative for arthralgias, back pain and myalgias.   Skin:  Negative for rash.   Neurological:  Negative for dizziness, weakness and numbness.   Hematological:  Does not bruise/bleed easily.   Psychiatric/Behavioral:  Positive for dysphoric mood. Negative for sleep disturbance. The patient is nervous/anxious.         States she has a lot of issues going on currently with her grandchildren, a lot of family stress       BP (!) 146/90   Pulse 63   Temp 97.7 °F (36.5 °C)   Ht 1.448 m (4' 9\")   Wt 130.2 kg (287 lb)   SpO2 96%   BMI 62.11 kg/m²      Physical Exam  Vitals and nursing note reviewed.   Constitutional:       General: She is not in acute distress.     Appearance: Normal appearance. She is well-developed. She is obese. She is not diaphoretic.   HENT:      Head: Normocephalic and atraumatic.      Right Ear: Hearing, tympanic membrane, ear canal and external ear normal.      Left Ear: Hearing, tympanic membrane, ear canal and external ear normal.      Nose: Nose normal. No mucosal edema.      Mouth/Throat:      Mouth: Mucous membranes are moist.   Eyes:      General: Lids are normal. No scleral icterus.        Right eye: No discharge.         Left eye: No discharge.      Extraocular Movements: Extraocular movements intact.      Conjunctiva/sclera: Conjunctivae normal.   Neck:      Thyroid: No thyromegaly.   Cardiovascular:      Rate and Rhythm: Normal rate and regular rhythm.      Heart sounds: Normal heart sounds. No murmur

## 2024-05-01 DIAGNOSIS — R79.89 ELEVATED SERUM CREATININE: ICD-10-CM

## 2024-05-01 DIAGNOSIS — E03.9 HYPOTHYROIDISM, UNSPECIFIED TYPE: ICD-10-CM

## 2024-05-01 DIAGNOSIS — E55.9 VITAMIN D DEFICIENCY: Primary | ICD-10-CM

## 2024-05-01 DIAGNOSIS — R06.09 DYSPNEA ON EXERTION: ICD-10-CM

## 2024-05-01 DIAGNOSIS — R79.89 ELEVATED BRAIN NATRIURETIC PEPTIDE (BNP) LEVEL: ICD-10-CM

## 2024-05-01 RX ORDER — ERGOCALCIFEROL 1.25 MG/1
50000 CAPSULE ORAL WEEKLY
Qty: 12 CAPSULE | Refills: 1 | Status: SHIPPED | OUTPATIENT
Start: 2024-05-01

## 2024-05-01 NOTE — RESULT ENCOUNTER NOTE
Please notify patient that vitamin D is very low, will put her on a high-dose supplement and recheck levels in 3 months.    Cholesterol is a bit elevated at 202.  Suggest placing patient on a low-dose statin, let me know if this something she is open to please.    T4 is a bit elevated, but TSH is normal.  Will recheck levels in 2 weeks.    Kidney function is a bit elevated, unsure what her baseline is.  Will recheck this in 2 weeks.  Please ensure patient stays hydrated.  If function remains elevated, would recommend a referral to nephrology.    BNP is just a bit elevated, we will recheck these levels in 2 weeks.    Other labs appear to be within normal limits.    Please ensure patient knows to get labs done in 2 weeks and vitamin D check in 3 months.     The 10-year ASCVD risk score (Henri PRATHER, et al., 2019) is: 9.7%    Values used to calculate the score:      Age: 63 years      Sex: Female      Is Non- : No      Diabetic: No      Tobacco smoker: No      Systolic Blood Pressure: 146 mmHg      Is BP treated: Yes      HDL Cholesterol: 55 mg/dL      Total Cholesterol: 202 mg/dL

## 2024-05-03 ENCOUNTER — TELEPHONE (OUTPATIENT)
Dept: GASTROENTEROLOGY | Age: 64
End: 2024-05-03

## 2024-05-03 NOTE — TELEPHONE ENCOUNTER
NP / Screening for colon cancer / [facility]  The patient is on Eliquis and an a fib history.  LVM to call the office.  First attempt.  Mailing a new patient letter for a second.  Needs an OV first.

## 2024-05-03 NOTE — TELEPHONE ENCOUNTER
TBS/colonoscopy  Pt has not been established with a GI doctor.  She wishes to see Dr. Ochoa for screening colonoscopy

## 2024-05-15 ENCOUNTER — HOSPITAL ENCOUNTER (OUTPATIENT)
Age: 64
Discharge: HOME | End: 2024-05-15
Payer: MEDICARE

## 2024-05-15 ENCOUNTER — NURSE ONLY (OUTPATIENT)
Dept: FAMILY MEDICINE CLINIC | Age: 64
End: 2024-05-15
Payer: MEDICARE

## 2024-05-15 VITALS — DIASTOLIC BLOOD PRESSURE: 74 MMHG | SYSTOLIC BLOOD PRESSURE: 122 MMHG

## 2024-05-15 DIAGNOSIS — R79.89 ELEVATED BRAIN NATRIURETIC PEPTIDE (BNP) LEVEL: ICD-10-CM

## 2024-05-15 DIAGNOSIS — E03.9 HYPOTHYROIDISM, UNSPECIFIED TYPE: ICD-10-CM

## 2024-05-15 DIAGNOSIS — R06.09 DYSPNEA ON EXERTION: ICD-10-CM

## 2024-05-15 DIAGNOSIS — E03.9 HYPOTHYROIDISM, UNSPECIFIED TYPE: Primary | ICD-10-CM

## 2024-05-15 DIAGNOSIS — R79.89 ELEVATED SERUM CREATININE: ICD-10-CM

## 2024-05-15 DIAGNOSIS — I10 ESSENTIAL HYPERTENSION: Primary | ICD-10-CM

## 2024-05-15 LAB
25(OH)D3 SERPL-MCNC: 19.7 NG/ML (ref 30–100)
ANION GAP SERPL CALCULATED.3IONS-SCNC: 10 MMOL/L (ref 9–17)
BNP SERPL-MCNC: 580 PG/ML
BUN SERPL-MCNC: 18 MG/DL (ref 8–23)
CALCIUM SERPL-MCNC: 8.7 MG/DL (ref 8.6–10.4)
CHLORIDE SERPL-SCNC: 105 MMOL/L (ref 98–107)
CO2 SERPL-SCNC: 26 MMOL/L (ref 20–31)
CREAT SERPL-MCNC: 0.9 MG/DL (ref 0.5–0.9)
GFR, ESTIMATED: 71 ML/MIN/1.73M2
GLUCOSE SERPL-MCNC: 95 MG/DL (ref 70–99)
POTASSIUM SERPL-SCNC: 4.7 MMOL/L (ref 3.7–5.3)
SODIUM SERPL-SCNC: 141 MMOL/L (ref 135–144)
T4 FREE SERPL-MCNC: 1.8 NG/DL (ref 0.9–1.7)
TSH SERPL DL<=0.05 MIU/L-ACNC: 2.59 UIU/ML (ref 0.3–5)

## 2024-05-15 PROCEDURE — 99212 OFFICE O/P EST SF 10 MIN: CPT | Performed by: NURSE PRACTITIONER

## 2024-05-15 PROCEDURE — 36415 COLL VENOUS BLD VENIPUNCTURE: CPT

## 2024-05-15 PROCEDURE — 3074F SYST BP LT 130 MM HG: CPT | Performed by: NURSE PRACTITIONER

## 2024-05-15 PROCEDURE — 84439 ASSAY OF FREE THYROXINE: CPT

## 2024-05-15 PROCEDURE — 82306 VITAMIN D 25 HYDROXY: CPT

## 2024-05-15 PROCEDURE — 1123F ACP DISCUSS/DSCN MKR DOCD: CPT | Performed by: NURSE PRACTITIONER

## 2024-05-15 PROCEDURE — 80048 BASIC METABOLIC PNL TOTAL CA: CPT

## 2024-05-15 PROCEDURE — 84443 ASSAY THYROID STIM HORMONE: CPT

## 2024-05-15 PROCEDURE — 83880 ASSAY OF NATRIURETIC PEPTIDE: CPT

## 2024-05-15 PROCEDURE — 3078F DIAST BP <80 MM HG: CPT | Performed by: NURSE PRACTITIONER

## 2024-05-15 NOTE — RESULT ENCOUNTER NOTE
Dr. Arias notified of BP recheck 116/80. Per MD continue to hold lasix infusion until further orders this AM.  Will continue to monitor   Please notify patient that BNP is a bit higher, T4 is improved.  Continue taking current regimen in regards to the Synthroid.  Will recheck levels again in a couple months.  Orders will be placed.    In regards to the BNP, I think we should order an echocardiogram.  She should also follow closely with her cardiologist, please ensure she gets scheduled with them.  Last echo on file looks like it was in 2022.    TSH is normal.  Kidney function has improved.

## 2024-05-15 NOTE — PROGRESS NOTES
Blood pressure looks good today, I did review her log, looks like it was a bit elevated initially, but after being on the medication again for couple weeks it has come down.  Plan to continue current regimen for now.    Vitals:    05/15/24 0906   BP: 122/74      Electronically signed by EMANI Bernard CNP on 5/15/2024 at 9:22 AM

## 2024-05-15 NOTE — PROGRESS NOTES
Micheline Arguelles is being seen today in office for a LAB/MA visit for a Blood Pressure Recheck.     Micheline Arguelles was last seen 4/30/2024 and her Blood Pressure was:   BP Readings from Last 1 Encounters:   05/15/24 122/74         Blood Pressure taken today 5/15/2024 was:                Constance Acharya

## 2024-05-28 ENCOUNTER — HOSPITAL ENCOUNTER (OUTPATIENT)
Age: 64
Discharge: HOME | End: 2024-05-30
Payer: MEDICARE

## 2024-05-28 VITALS
HEART RATE: 63 BPM | BODY MASS INDEX: 61.92 KG/M2 | SYSTOLIC BLOOD PRESSURE: 122 MMHG | DIASTOLIC BLOOD PRESSURE: 74 MMHG | HEIGHT: 57 IN | WEIGHT: 287 LBS

## 2024-05-28 DIAGNOSIS — R06.09 DYSPNEA ON EXERTION: ICD-10-CM

## 2024-05-28 DIAGNOSIS — R79.89 ELEVATED BRAIN NATRIURETIC PEPTIDE (BNP) LEVEL: ICD-10-CM

## 2024-05-28 LAB
ECHO AO ROOT DIAM: 3.4 CM
ECHO AO ROOT INDEX: 1.62 CM/M2
ECHO AV AREA PEAK VELOCITY: 2.4 CM2
ECHO AV AREA VTI: 2.1 CM2
ECHO AV AREA/BSA PEAK VELOCITY: 1.1 CM2/M2
ECHO AV AREA/BSA VTI: 1 CM2/M2
ECHO AV MEAN GRADIENT: 7 MMHG
ECHO AV MEAN VELOCITY: 1.3 M/S
ECHO AV PEAK GRADIENT: 13 MMHG
ECHO AV PEAK VELOCITY: 1.8 M/S
ECHO AV VELOCITY RATIO: 0.78
ECHO AV VTI: 44.6 CM
ECHO BSA: 2.29 M2
ECHO EST RA PRESSURE: 3 MMHG
ECHO LA AREA 2C: 25.8 CM2
ECHO LA AREA 4C: 25.9 CM2
ECHO LA DIAMETER INDEX: 1.76 CM/M2
ECHO LA DIAMETER: 3.7 CM
ECHO LA MAJOR AXIS: 7.3 CM
ECHO LA MINOR AXIS: 7 CM
ECHO LA TO AORTIC ROOT RATIO: 1.09
ECHO LA VOL BP: 78 ML (ref 22–52)
ECHO LA VOL MOD A2C: 79 ML (ref 22–52)
ECHO LA VOL MOD A4C: 73 ML (ref 22–52)
ECHO LA VOL/BSA BIPLANE: 37 ML/M2 (ref 16–34)
ECHO LA VOLUME INDEX MOD A2C: 38 ML/M2 (ref 16–34)
ECHO LA VOLUME INDEX MOD A4C: 35 ML/M2 (ref 16–34)
ECHO LV E' LATERAL VELOCITY: 13 CM/S
ECHO LV E' SEPTAL VELOCITY: 9 CM/S
ECHO LV FRACTIONAL SHORTENING: 29 % (ref 28–44)
ECHO LV INTERNAL DIMENSION DIASTOLE INDEX: 2 CM/M2
ECHO LV INTERNAL DIMENSION DIASTOLIC: 4.2 CM (ref 3.9–5.3)
ECHO LV INTERNAL DIMENSION SYSTOLIC INDEX: 1.43 CM/M2
ECHO LV INTERNAL DIMENSION SYSTOLIC: 3 CM
ECHO LV IVSD: 1.3 CM (ref 0.6–0.9)
ECHO LV MASS 2D: 189.2 G (ref 67–162)
ECHO LV MASS INDEX 2D: 90.1 G/M2 (ref 43–95)
ECHO LV POSTERIOR WALL DIASTOLIC: 1.2 CM (ref 0.6–0.9)
ECHO LV RELATIVE WALL THICKNESS RATIO: 0.57
ECHO LVOT AREA: 3.1 CM2
ECHO LVOT AV VTI INDEX: 0.65
ECHO LVOT DIAM: 2 CM
ECHO LVOT MEAN GRADIENT: 4 MMHG
ECHO LVOT PEAK GRADIENT: 7 MMHG
ECHO LVOT PEAK VELOCITY: 1.4 M/S
ECHO LVOT STROKE VOLUME INDEX: 43.7 ML/M2
ECHO LVOT SV: 91.7 ML
ECHO LVOT VTI: 29.2 CM
ECHO MV A VELOCITY: 0.76 M/S
ECHO MV E DECELERATION TIME (DT): 194 MS
ECHO MV E VELOCITY: 1.16 M/S
ECHO MV E/A RATIO: 1.53
ECHO MV E/E' LATERAL: 8.92
ECHO MV E/E' RATIO (AVERAGED): 10.91
ECHO MV E/E' SEPTAL: 12.89
ECHO RA AREA 4C: 18.9 CM2
ECHO RA VOLUME: 50 ML
ECHO RIGHT VENTRICULAR SYSTOLIC PRESSURE (RVSP): 28 MMHG
ECHO RV TAPSE: 2 CM (ref 1.7–?)
ECHO TV REGURGITANT MAX VELOCITY: 2.5 M/S
ECHO TV REGURGITANT PEAK GRADIENT: 22 MMHG

## 2024-05-28 PROCEDURE — 93306 TTE W/DOPPLER COMPLETE: CPT

## 2024-05-28 PROCEDURE — 93306 TTE W/DOPPLER COMPLETE: CPT | Performed by: INTERNAL MEDICINE

## 2024-05-29 NOTE — RESULT ENCOUNTER NOTE
Please notify patient that ejection fraction is normal.  Size of left ventricle is normal.  Normal wall motion is noted.  I do recommend she still follow-up with cardiology, does she need a new referral?  I know she used to see Dr. Suh.

## 2024-06-04 ENCOUNTER — OFFICE VISIT (OUTPATIENT)
Dept: PODIATRY | Age: 64
End: 2024-06-04
Payer: MEDICARE

## 2024-06-04 VITALS — BODY MASS INDEX: 61.92 KG/M2 | WEIGHT: 287 LBS | HEIGHT: 57 IN

## 2024-06-04 DIAGNOSIS — M79.675 PAIN OF TOES OF BOTH FEET: ICD-10-CM

## 2024-06-04 DIAGNOSIS — M79.674 PAIN IN TOE OF RIGHT FOOT: ICD-10-CM

## 2024-06-04 DIAGNOSIS — M79.674 PAIN OF TOES OF BOTH FEET: ICD-10-CM

## 2024-06-04 DIAGNOSIS — B35.1 ONYCHOMYCOSIS OF TOENAIL: Primary | ICD-10-CM

## 2024-06-04 PROCEDURE — 3017F COLORECTAL CA SCREEN DOC REV: CPT | Performed by: PODIATRIST

## 2024-06-04 PROCEDURE — 99203 OFFICE O/P NEW LOW 30 MIN: CPT | Performed by: PODIATRIST

## 2024-06-04 PROCEDURE — 1123F ACP DISCUSS/DSCN MKR DOCD: CPT | Performed by: PODIATRIST

## 2024-06-04 PROCEDURE — G8417 CALC BMI ABV UP PARAM F/U: HCPCS | Performed by: PODIATRIST

## 2024-06-04 PROCEDURE — G8400 PT W/DXA NO RESULTS DOC: HCPCS | Performed by: PODIATRIST

## 2024-06-04 PROCEDURE — 1036F TOBACCO NON-USER: CPT | Performed by: PODIATRIST

## 2024-06-04 PROCEDURE — G8427 DOCREV CUR MEDS BY ELIG CLIN: HCPCS | Performed by: PODIATRIST

## 2024-06-04 PROCEDURE — 1090F PRES/ABSN URINE INCON ASSESS: CPT | Performed by: PODIATRIST

## 2024-06-04 PROCEDURE — 11720 DEBRIDE NAIL 1-5: CPT | Performed by: PODIATRIST

## 2024-06-04 RX ORDER — ALENDRONATE SODIUM 70 MG/1
TABLET ORAL
COMMUNITY
Start: 2023-05-30

## 2024-06-04 RX ORDER — CARVEDILOL 6.25 MG/1
TABLET ORAL
COMMUNITY

## 2024-06-04 RX ORDER — PANTOPRAZOLE SODIUM 40 MG/1
40 TABLET, DELAYED RELEASE ORAL
COMMUNITY
Start: 2022-09-20

## 2024-06-04 ASSESSMENT — ENCOUNTER SYMPTOMS
NAUSEA: 0
COLOR CHANGE: 0
BACK PAIN: 0
DIARRHEA: 0
SHORTNESS OF BREATH: 0

## 2024-06-04 NOTE — PROGRESS NOTES
Micheline Arguelles is a 63 y.o. female who presents to the office today with chief complaint of painful nail to the right great toe.  Chief Complaint   Patient presents with    Nail Problem     Right hallux/ last seen Seymour Corado 4/30/2024   Symptoms began about 10 year(s) ago. Patient complains of injury to the right foot. Patient states that she lost the right great toenail in 2013. Patient states that the toenail grew back abnormal and has been painful since 2014. Patient states that she was seeing another Podiatrist, but her kids started trimming her toenail, so she didn't feel she needed that service any longer. Patient states that the toenail pain has increased and she would now like to have it treated by a doctor. Pain is rated 5 out of 10 at it's worst and is described as intermittent.     Allergies   Allergen Reactions    Seasonal Other (See Comments)     ITCHY EYES, SNEEZING       Past Medical History:   Diagnosis Date    Adjustment disorder with mixed anxiety and depressed mood 3/6/2015    Arthritis     BILATERAL KNEES AND HANDS    Atrial fibrillation ([facility]) 1/2015    \"stress induced\"  (Dr. Chahal)    Cataracts, bilateral     Depression 7/31/2017    H/O vitamin D deficiency     Hyperlipidemia     Hypertension     Thyroid disease     Urinary incontinence     Urinary urgency     Wears glasses     READING       Prior to Admission medications    Medication Sig Start Date End Date Taking? Authorizing Provider   pantoprazole (PROTONIX) 40 MG tablet Take 1 tablet by mouth every morning (before breakfast) 9/20/22  Yes ProviderJack MD   carvedilol (COREG) 6.25 MG tablet TAKE 1 TABLET TWICE A DAY WITH  FOOD Orally Twice a day for 90 days   Yes ProviderJack MD   alendronate (FOSAMAX) 70 MG tablet TAKE 1 TABLET BY MOUTH WEEKLY  WITH 8 OZ OF PLAIN WATER 30  MINUTES BEFORE FIRST FOOD, DRINK OR MEDS. STAY UPRIGHT FOR 30  MINS 5/30/23  Yes ProviderJack MD   vitamin D (ERGOCALCIFEROL) 1.25 MG

## 2024-06-28 ENCOUNTER — HOSPITAL ENCOUNTER (OUTPATIENT)
Age: 64
Discharge: HOME | End: 2024-06-28
Payer: MEDICARE

## 2024-06-28 LAB
T4 FREE SERPL-MCNC: 1.6 NG/DL (ref 0.9–1.7)
TSH SERPL DL<=0.05 MIU/L-ACNC: 2.5 UIU/ML (ref 0.3–5)

## 2024-06-28 PROCEDURE — 36415 COLL VENOUS BLD VENIPUNCTURE: CPT

## 2024-06-28 PROCEDURE — 84443 ASSAY THYROID STIM HORMONE: CPT

## 2024-06-28 PROCEDURE — 84439 ASSAY OF FREE THYROXINE: CPT

## 2024-08-05 ENCOUNTER — HOSPITAL ENCOUNTER (OUTPATIENT)
Dept: WOMENS IMAGING | Age: 64
Discharge: HOME | End: 2024-08-07
Payer: MEDICARE

## 2024-08-05 DIAGNOSIS — Z78.0 POST-MENOPAUSAL: ICD-10-CM

## 2024-08-05 PROCEDURE — 77080 DXA BONE DENSITY AXIAL: CPT

## 2024-08-13 ENCOUNTER — OFFICE VISIT (OUTPATIENT)
Dept: PODIATRY | Age: 64
End: 2024-08-13

## 2024-08-13 ENCOUNTER — OFFICE VISIT (OUTPATIENT)
Dept: FAMILY MEDICINE CLINIC | Age: 64
End: 2024-08-13
Payer: MEDICARE

## 2024-08-13 VITALS
SYSTOLIC BLOOD PRESSURE: 112 MMHG | WEIGHT: 293 LBS | HEIGHT: 57 IN | DIASTOLIC BLOOD PRESSURE: 80 MMHG | HEART RATE: 60 BPM | BODY MASS INDEX: 63.21 KG/M2 | OXYGEN SATURATION: 98 %

## 2024-08-13 VITALS — WEIGHT: 293 LBS | BODY MASS INDEX: 63.21 KG/M2 | HEIGHT: 57 IN

## 2024-08-13 DIAGNOSIS — Z23 NEED FOR PNEUMOCOCCAL VACCINATION: ICD-10-CM

## 2024-08-13 DIAGNOSIS — B35.1 ONYCHOMYCOSIS OF TOENAIL: Primary | ICD-10-CM

## 2024-08-13 DIAGNOSIS — M85.80 OSTEOPENIA, UNSPECIFIED LOCATION: ICD-10-CM

## 2024-08-13 DIAGNOSIS — M79.675 PAIN OF TOES OF BOTH FEET: ICD-10-CM

## 2024-08-13 DIAGNOSIS — Z00.00 INITIAL MEDICARE ANNUAL WELLNESS VISIT: Primary | ICD-10-CM

## 2024-08-13 DIAGNOSIS — I10 ESSENTIAL HYPERTENSION: ICD-10-CM

## 2024-08-13 DIAGNOSIS — M79.674 PAIN OF TOES OF BOTH FEET: ICD-10-CM

## 2024-08-13 DIAGNOSIS — E03.9 HYPOTHYROIDISM, UNSPECIFIED TYPE: ICD-10-CM

## 2024-08-13 PROCEDURE — 3079F DIAST BP 80-89 MM HG: CPT | Performed by: NURSE PRACTITIONER

## 2024-08-13 PROCEDURE — 90677 PCV20 VACCINE IM: CPT | Performed by: NURSE PRACTITIONER

## 2024-08-13 PROCEDURE — G0009 ADMIN PNEUMOCOCCAL VACCINE: HCPCS | Performed by: NURSE PRACTITIONER

## 2024-08-13 PROCEDURE — G0438 PPPS, INITIAL VISIT: HCPCS | Performed by: NURSE PRACTITIONER

## 2024-08-13 PROCEDURE — 1123F ACP DISCUSS/DSCN MKR DOCD: CPT | Performed by: NURSE PRACTITIONER

## 2024-08-13 PROCEDURE — 3074F SYST BP LT 130 MM HG: CPT | Performed by: NURSE PRACTITIONER

## 2024-08-13 PROCEDURE — 3017F COLORECTAL CA SCREEN DOC REV: CPT | Performed by: NURSE PRACTITIONER

## 2024-08-13 RX ORDER — ALENDRONATE SODIUM 70 MG/1
70 TABLET ORAL
Qty: 12 TABLET | Refills: 0 | Status: SHIPPED | OUTPATIENT
Start: 2024-08-13 | End: 2024-11-11

## 2024-08-13 SDOH — ECONOMIC STABILITY: FOOD INSECURITY: WITHIN THE PAST 12 MONTHS, THE FOOD YOU BOUGHT JUST DIDN'T LAST AND YOU DIDN'T HAVE MONEY TO GET MORE.: NEVER TRUE

## 2024-08-13 SDOH — ECONOMIC STABILITY: FOOD INSECURITY: WITHIN THE PAST 12 MONTHS, YOU WORRIED THAT YOUR FOOD WOULD RUN OUT BEFORE YOU GOT MONEY TO BUY MORE.: NEVER TRUE

## 2024-08-13 SDOH — ECONOMIC STABILITY: INCOME INSECURITY: HOW HARD IS IT FOR YOU TO PAY FOR THE VERY BASICS LIKE FOOD, HOUSING, MEDICAL CARE, AND HEATING?: NOT HARD AT ALL

## 2024-08-13 ASSESSMENT — PATIENT HEALTH QUESTIONNAIRE - PHQ9
7. TROUBLE CONCENTRATING ON THINGS, SUCH AS READING THE NEWSPAPER OR WATCHING TELEVISION: NOT AT ALL
4. FEELING TIRED OR HAVING LITTLE ENERGY: NOT AT ALL
SUM OF ALL RESPONSES TO PHQ QUESTIONS 1-9: 0
8. MOVING OR SPEAKING SO SLOWLY THAT OTHER PEOPLE COULD HAVE NOTICED. OR THE OPPOSITE, BEING SO FIGETY OR RESTLESS THAT YOU HAVE BEEN MOVING AROUND A LOT MORE THAN USUAL: NOT AT ALL
SUM OF ALL RESPONSES TO PHQ QUESTIONS 1-9: 0
2. FEELING DOWN, DEPRESSED OR HOPELESS: NOT AT ALL
3. TROUBLE FALLING OR STAYING ASLEEP: NOT AT ALL
10. IF YOU CHECKED OFF ANY PROBLEMS, HOW DIFFICULT HAVE THESE PROBLEMS MADE IT FOR YOU TO DO YOUR WORK, TAKE CARE OF THINGS AT HOME, OR GET ALONG WITH OTHER PEOPLE: NOT DIFFICULT AT ALL
9. THOUGHTS THAT YOU WOULD BE BETTER OFF DEAD, OR OF HURTING YOURSELF: NOT AT ALL
SUM OF ALL RESPONSES TO PHQ QUESTIONS 1-9: 0
6. FEELING BAD ABOUT YOURSELF - OR THAT YOU ARE A FAILURE OR HAVE LET YOURSELF OR YOUR FAMILY DOWN: NOT AT ALL
SUM OF ALL RESPONSES TO PHQ QUESTIONS 1-9: 0

## 2024-08-13 ASSESSMENT — LIFESTYLE VARIABLES
HOW MANY STANDARD DRINKS CONTAINING ALCOHOL DO YOU HAVE ON A TYPICAL DAY: 1 OR 2
HOW OFTEN DO YOU HAVE A DRINK CONTAINING ALCOHOL: MONTHLY OR LESS

## 2024-08-13 NOTE — PROGRESS NOTES
Visit Information    Have you changed or started any medications since your last visit including any over-the-counter medicines, vitamins, or herbal medicines? no   Have you stopped taking any of your medications? Is so, why? -  no  Are you having any side effects from any of your medications? - no    Have you seen any other physician or provider since your last visit?  no   Have you had any other diagnostic tests since your last visit?  yes  Have you been seen in the emergency room and/or had an admission in a hospital since we last saw you?  no   Have you had your routine dental cleaning in the past 6 months?  no     Do you have an active MyChart account? If no, what is the barrier?  Yes    Patient Care Team:  Seymour Corado APRN - CNP as PCP - General (Nurse Practitioner)  Seymour Corado APRN - CNP as PCP - Empaneled Provider    Medical History Review  Past Medical, Family, and Social History reviewed and does contribute to the patient presenting condition    Health Maintenance   Topic Date Due    Colorectal Cancer Screen  Never done    Pneumococcal 65+ years Vaccine (2 of 2 - PCV) 06/23/2023    Annual Wellness Visit (Medicare)  Never done    COVID-19 Vaccine (7 - 2023-24 season) 04/06/2024    Flu vaccine (1) 08/01/2024    Respiratory Syncytial Virus (RSV) Pregnant or age 60 yrs+ (1 - 1-dose 60+ series) 04/30/2025 (Originally 6/23/2018)    Depression Monitoring  04/30/2025    Breast cancer screen  11/08/2025    Lipids  04/30/2029    DTaP/Tdap/Td vaccine (2 - Td or Tdap) 05/06/2032    DEXA (modify frequency per FRAX score)  Completed    Shingles vaccine  Completed    Hepatitis C screen  Completed    Hepatitis A vaccine  Aged Out    Hepatitis B vaccine  Aged Out    Hib vaccine  Aged Out    Polio vaccine  Aged Out    Meningococcal (ACWY) vaccine  Aged Out    Depression Screen  Discontinued    GFR test (Diabetes, CKD 3-4, OR last GFR 15-59)  Discontinued    Pneumococcal 0-64 years Vaccine  Discontinued    Diabetes

## 2024-08-13 NOTE — PROGRESS NOTES
Medicare Annual Wellness Visit    Micheline Arguelles is here for Medicare AWV and Discuss Labs (Dexa Scan)    Assessment & Plan   Initial Medicare annual wellness visit  Osteopenia, unspecified location  -Noted on DEXA scan  -Continue Fosamax  -Chronic condition  -     alendronate (FOSAMAX) 70 MG tablet; Take 1 tablet by mouth every 7 days, Disp-12 tablet, R-0Normal  Body mass index (BMI) 60.0-69.9, adult (HCC)  -Worsening  -Discussed lifestyle modifications  -Declines any further intervention from us at this time  Need for pneumococcal vaccination  -     Pneumococcal, PCV20, PREVNAR 20, (age 6w+), IM, PF  Essential hypertension  -Controlled  -No change in plan of care  -Continue to follow cardiology  Hypothyroidism, unspecified type  -Controlled  -No change in plan of care    Recommendations for Preventive Services Due: see orders and patient instructions/AVS.  Recommended screening schedule for the next 5-10 years is provided to the patient in written form: see Patient Instructions/AVS.     Return in 4 months (on 12/13/2024).     Subjective   The following acute and/or chronic problems were also addressed today: Hypothyroidism, osteopenia, obesity, hypertension    Patient's complete Health Risk Assessment and screening values have been reviewed and are found in Flowsheets. The following problems were reviewed today and where indicated follow up appointments were made and/or referrals ordered.    Positive Risk Factor Screenings with Interventions:                  Abnormal BMI (obese):  Body mass index is 63.66 kg/m². (!) Abnormal  Interventions:  Patient comments: She states that she has not really changed much of her eating habits, has not been exercising, states she knows what she needs to do, declines any further intervention from us at this time      Dentist Screen:  Have you seen the dentist within the past year?: (!) No    Intervention:  Advised to schedule with their dentist        Advanced Directives:  Do

## 2024-08-17 ASSESSMENT — ENCOUNTER SYMPTOMS
BACK PAIN: 0
DIARRHEA: 0
SHORTNESS OF BREATH: 0
COLOR CHANGE: 0
NAUSEA: 0

## 2024-08-17 NOTE — PROGRESS NOTES
SUBJECTIVE: Michelnie Arguelles is a 64 y.o. female who returns to the office with chief complaint of painful fungal toenails. Patient relates toe nails are thickened/difficult to trim as well as painful with ambulation and with shoe gear.   Chief Complaint   Patient presents with    Nail Problem     Nail trim/last saw Seymour Corado MARGARET 8/13/24     Review of Systems   Constitutional:  Negative for activity change, appetite change, chills, diaphoresis, fatigue and fever.   Respiratory:  Negative for shortness of breath.    Cardiovascular:  Negative for leg swelling.   Gastrointestinal:  Negative for diarrhea and nausea.   Endocrine: Negative for cold intolerance, heat intolerance and polyuria.   Musculoskeletal:  Positive for arthralgias. Negative for back pain, gait problem, joint swelling and myalgias.   Skin:  Negative for color change, pallor, rash and wound.   Allergic/Immunologic: Negative for environmental allergies and food allergies.   Neurological:  Negative for dizziness, weakness, light-headedness and numbness.   Hematological:  Does not bruise/bleed easily.   Psychiatric/Behavioral:  Negative for behavioral problems, confusion and self-injury. The patient is not nervous/anxious.      OBJECTIVE: Clinical evaluation of patient reveals nails 1,2,3,4,5 of the right foot and nails 1,2,3,4,5 of the left foot to present with thickness, elongation, discoloration, brittleness, and subungual debris. There was pain with palpation and debridement of the toenails of the bilateral feet. No open lesions noted to either foot today.     Class A Findings (1 needed)   [] Non-traumatic amputation of foot or integral skeleton portion thereof.   [] Q7.      Class B Findings (2 needed)   1. [] Absent posterior tibial pulse   2. [] Absent dorsalis pedis pulse   3. [] Advanced trophic changes; three of the following are required:   ·         [] hair growth (decrease or absence)   ·         [] nail changes (thickening)   ·         []

## 2024-08-27 ENCOUNTER — OFFICE VISIT (OUTPATIENT)
Dept: GASTROENTEROLOGY | Age: 64
End: 2024-08-27
Payer: MEDICARE

## 2024-08-27 ENCOUNTER — TELEPHONE (OUTPATIENT)
Dept: GASTROENTEROLOGY | Age: 64
End: 2024-08-27

## 2024-08-27 VITALS
TEMPERATURE: 97.6 F | SYSTOLIC BLOOD PRESSURE: 147 MMHG | WEIGHT: 293 LBS | BODY MASS INDEX: 63.84 KG/M2 | DIASTOLIC BLOOD PRESSURE: 70 MMHG

## 2024-08-27 DIAGNOSIS — E66.01 MORBID OBESITY (HCC): Primary | ICD-10-CM

## 2024-08-27 DIAGNOSIS — Z12.11 SCREENING FOR COLORECTAL CANCER: ICD-10-CM

## 2024-08-27 DIAGNOSIS — Z12.12 SCREENING FOR COLORECTAL CANCER: ICD-10-CM

## 2024-08-27 PROCEDURE — 3077F SYST BP >= 140 MM HG: CPT | Performed by: INTERNAL MEDICINE

## 2024-08-27 PROCEDURE — 1036F TOBACCO NON-USER: CPT | Performed by: INTERNAL MEDICINE

## 2024-08-27 PROCEDURE — G8427 DOCREV CUR MEDS BY ELIG CLIN: HCPCS | Performed by: INTERNAL MEDICINE

## 2024-08-27 PROCEDURE — 3078F DIAST BP <80 MM HG: CPT | Performed by: INTERNAL MEDICINE

## 2024-08-27 PROCEDURE — 99204 OFFICE O/P NEW MOD 45 MIN: CPT | Performed by: INTERNAL MEDICINE

## 2024-08-27 PROCEDURE — 1123F ACP DISCUSS/DSCN MKR DOCD: CPT | Performed by: INTERNAL MEDICINE

## 2024-08-27 PROCEDURE — G8417 CALC BMI ABV UP PARAM F/U: HCPCS | Performed by: INTERNAL MEDICINE

## 2024-08-27 PROCEDURE — 3017F COLORECTAL CA SCREEN DOC REV: CPT | Performed by: INTERNAL MEDICINE

## 2024-08-27 PROCEDURE — 1090F PRES/ABSN URINE INCON ASSESS: CPT | Performed by: INTERNAL MEDICINE

## 2024-08-27 PROCEDURE — G8399 PT W/DXA RESULTS DOCUMENT: HCPCS | Performed by: INTERNAL MEDICINE

## 2024-08-27 ASSESSMENT — ENCOUNTER SYMPTOMS
SHORTNESS OF BREATH: 0
DIARRHEA: 0
SORE THROAT: 0
NAUSEA: 0
ANAL BLEEDING: 0
TROUBLE SWALLOWING: 0
RECTAL PAIN: 0
ABDOMINAL PAIN: 0
CHOKING: 0
VOICE CHANGE: 0
WHEEZING: 0
ABDOMINAL DISTENTION: 0
VOMITING: 0
CONSTIPATION: 0
BLOOD IN STOOL: 0
COUGH: 0

## 2024-08-27 NOTE — PROGRESS NOTES
GI NEW PATIENT OFFICE VISIT    INTERVAL HISTORY:   Semyour Corado, APRN - CNP  [address], [address]  [place] [address]    Chief Complaint   Patient presents with    Colonoscopy     Patient is here today as a NP to discuss screening for colonoscopy procedure.       1. Morbid obesity (HCC)    2. Screening for colorectal cancer        This patient seen my office for the first time however she is known to me from her  who is also my patient  She has history significant multiple chronic medical issues  She has history significant for morbid obesity    She is due for her colonoscopy for screening purposes    No known family history for colon cancer    Pt is clinically feeling well GI wise  Has No significant abdominal pains, bloating or cramping  Has no sig GERD symptoms  Has no Dysphagia, No Nausea or any vomiting  Denies any rectal bleeding or any melena  Denies any sig constipation or any diarrhea symptoms  Weight is stable and appetite is generally good.    No smoking alcohol abuse illicit drug usage    Available records were reviewed with    She has been taken blood thinners for cardiac issues has been followed by cardiology      HISTORY OF PRESENT ILLNESS: Ms.Mary LOLLY Arguelles is a 64 y.o. female with a past history remarkable for , referred for evaluation of   Chief Complaint   Patient presents with    Colonoscopy     Patient is here today as a NP to discuss screening for colonoscopy procedure.   .    Past Medical,Family, and Social History reviewed and does contribute to the patient presenting condition.    Patient's PMH/PSH,SH,PSYCH Hx, MEDs, ALLERGIES, and ROS were all reviewed and updated in the appropriate sections.    PAST MEDICAL HISTORY:  Past Medical History:   Diagnosis Date    Adjustment disorder with mixed anxiety and depressed mood 3/6/2015    Arthritis     BILATERAL KNEES AND HANDS    Atrial fibrillation (HCC) 1/2015    \"stress induced\"  (Dr. Chahal)    Cataracts, bilateral

## 2024-09-10 ENCOUNTER — HOSPITAL ENCOUNTER (OUTPATIENT)
Age: 64
Discharge: HOME | End: 2024-09-10
Payer: MEDICARE

## 2024-09-10 DIAGNOSIS — E55.9 VITAMIN D DEFICIENCY: ICD-10-CM

## 2024-09-10 DIAGNOSIS — E55.9 VITAMIN D DEFICIENCY: Primary | ICD-10-CM

## 2024-09-10 LAB — 25(OH)D3 SERPL-MCNC: 32.4 NG/ML (ref 30–100)

## 2024-09-10 PROCEDURE — 36415 COLL VENOUS BLD VENIPUNCTURE: CPT

## 2024-09-10 PROCEDURE — 82306 VITAMIN D 25 HYDROXY: CPT

## 2024-09-26 PROBLEM — Z12.11 SCREENING FOR COLORECTAL CANCER: Status: RESOLVED | Noted: 2024-08-27 | Resolved: 2024-09-26

## 2024-09-26 PROBLEM — Z12.12 SCREENING FOR COLORECTAL CANCER: Status: RESOLVED | Noted: 2024-08-27 | Resolved: 2024-09-26

## 2024-10-01 DIAGNOSIS — M85.80 OSTEOPENIA, UNSPECIFIED LOCATION: ICD-10-CM

## 2024-10-02 RX ORDER — ALENDRONATE SODIUM 70 MG/1
TABLET ORAL
Qty: 12 TABLET | Refills: 3 | Status: SHIPPED | OUTPATIENT
Start: 2024-10-02

## 2024-10-02 NOTE — TELEPHONE ENCOUNTER
Please Approve or Refuse.  Send to Pharmacy per Pt's Request: [facility]      Next Visit Date:  10/16/2024   Last Visit Date: 8/13/2024    Hemoglobin A1C (%)   Date Value   04/30/2024 4.8             ( goal A1C is < 7)   BP Readings from Last 3 Encounters:   08/27/24 (!) 147/70   08/13/24 112/80   05/28/24 122/74          (goal 120/80)  BUN   Date Value Ref Range Status   05/15/2024 18 8 - 23 mg/dL Final     Creatinine   Date Value Ref Range Status   05/15/2024 0.9 0.5 - 0.9 mg/dL Final     Potassium   Date Value Ref Range Status   05/15/2024 4.7 3.7 - 5.3 mmol/L Final

## 2024-10-15 DIAGNOSIS — E55.9 VITAMIN D DEFICIENCY: ICD-10-CM

## 2024-10-15 RX ORDER — ERGOCALCIFEROL 1.25 MG/1
50000 CAPSULE, LIQUID FILLED ORAL WEEKLY
Qty: 12 CAPSULE | Refills: 1 | Status: SHIPPED | OUTPATIENT
Start: 2024-10-15

## 2024-10-15 NOTE — TELEPHONE ENCOUNTER
Please Approve or Refuse.  Send to Pharmacy per Pt's Request:      Next Visit Date:  10/16/2024   Last Visit Date: 8/13/2024    Hemoglobin A1C (%)   Date Value   04/30/2024 4.8             ( goal A1C is < 7)   BP Readings from Last 3 Encounters:   08/27/24 (!) 147/70   08/13/24 112/80   05/28/24 122/74          (goal 120/80)  BUN   Date Value Ref Range Status   05/15/2024 18 8 - 23 mg/dL Final     Creatinine   Date Value Ref Range Status   05/15/2024 0.9 0.5 - 0.9 mg/dL Final     Potassium   Date Value Ref Range Status   05/15/2024 4.7 3.7 - 5.3 mmol/L Final

## 2024-10-16 ENCOUNTER — OFFICE VISIT (OUTPATIENT)
Dept: FAMILY MEDICINE CLINIC | Age: 64
End: 2024-10-16
Payer: MEDICARE

## 2024-10-16 ENCOUNTER — TELEPHONE (OUTPATIENT)
Dept: GASTROENTEROLOGY | Age: 64
End: 2024-10-16

## 2024-10-16 VITALS
OXYGEN SATURATION: 98 % | DIASTOLIC BLOOD PRESSURE: 78 MMHG | WEIGHT: 293 LBS | HEART RATE: 54 BPM | TEMPERATURE: 97.8 F | HEIGHT: 58 IN | SYSTOLIC BLOOD PRESSURE: 124 MMHG | BODY MASS INDEX: 61.5 KG/M2

## 2024-10-16 DIAGNOSIS — I10 ESSENTIAL HYPERTENSION: ICD-10-CM

## 2024-10-16 DIAGNOSIS — R73.9 HYPERGLYCEMIA: ICD-10-CM

## 2024-10-16 DIAGNOSIS — Z01.818 PRE-OP TESTING: ICD-10-CM

## 2024-10-16 DIAGNOSIS — Z23 NEED FOR INFLUENZA VACCINATION: ICD-10-CM

## 2024-10-16 DIAGNOSIS — Z12.12 SCREENING FOR COLORECTAL CANCER: ICD-10-CM

## 2024-10-16 DIAGNOSIS — Z12.11 SCREEN FOR COLON CANCER: Primary | ICD-10-CM

## 2024-10-16 DIAGNOSIS — Z12.11 SCREENING FOR COLORECTAL CANCER: ICD-10-CM

## 2024-10-16 DIAGNOSIS — E03.9 HYPOTHYROIDISM, UNSPECIFIED TYPE: Primary | ICD-10-CM

## 2024-10-16 DIAGNOSIS — I48.91 ATRIAL FIBRILLATION WITH RAPID VENTRICULAR RESPONSE (HCC): ICD-10-CM

## 2024-10-16 PROCEDURE — G8417 CALC BMI ABV UP PARAM F/U: HCPCS | Performed by: NURSE PRACTITIONER

## 2024-10-16 PROCEDURE — 90661 CCIIV3 VAC ABX FR 0.5 ML IM: CPT | Performed by: NURSE PRACTITIONER

## 2024-10-16 PROCEDURE — 1036F TOBACCO NON-USER: CPT | Performed by: NURSE PRACTITIONER

## 2024-10-16 PROCEDURE — 3017F COLORECTAL CA SCREEN DOC REV: CPT | Performed by: NURSE PRACTITIONER

## 2024-10-16 PROCEDURE — G8427 DOCREV CUR MEDS BY ELIG CLIN: HCPCS | Performed by: NURSE PRACTITIONER

## 2024-10-16 PROCEDURE — 1123F ACP DISCUSS/DSCN MKR DOCD: CPT | Performed by: NURSE PRACTITIONER

## 2024-10-16 PROCEDURE — G8484 FLU IMMUNIZE NO ADMIN: HCPCS | Performed by: NURSE PRACTITIONER

## 2024-10-16 PROCEDURE — 1090F PRES/ABSN URINE INCON ASSESS: CPT | Performed by: NURSE PRACTITIONER

## 2024-10-16 PROCEDURE — 3074F SYST BP LT 130 MM HG: CPT | Performed by: NURSE PRACTITIONER

## 2024-10-16 PROCEDURE — 3078F DIAST BP <80 MM HG: CPT | Performed by: NURSE PRACTITIONER

## 2024-10-16 PROCEDURE — G8399 PT W/DXA RESULTS DOCUMENT: HCPCS | Performed by: NURSE PRACTITIONER

## 2024-10-16 PROCEDURE — 99213 OFFICE O/P EST LOW 20 MIN: CPT | Performed by: NURSE PRACTITIONER

## 2024-10-16 PROCEDURE — G0008 ADMIN INFLUENZA VIRUS VAC: HCPCS | Performed by: NURSE PRACTITIONER

## 2024-10-16 RX ORDER — AMIODARONE HYDROCHLORIDE 200 MG/1
100 TABLET ORAL 2 TIMES DAILY
COMMUNITY
Start: 2024-09-11

## 2024-10-16 RX ORDER — BUMETANIDE 1 MG/1
TABLET ORAL
COMMUNITY
Start: 2024-08-26

## 2024-10-16 SDOH — ECONOMIC STABILITY: FOOD INSECURITY: WITHIN THE PAST 12 MONTHS, YOU WORRIED THAT YOUR FOOD WOULD RUN OUT BEFORE YOU GOT MONEY TO BUY MORE.: NEVER TRUE

## 2024-10-16 SDOH — ECONOMIC STABILITY: INCOME INSECURITY: HOW HARD IS IT FOR YOU TO PAY FOR THE VERY BASICS LIKE FOOD, HOUSING, MEDICAL CARE, AND HEATING?: NOT HARD AT ALL

## 2024-10-16 SDOH — ECONOMIC STABILITY: FOOD INSECURITY: WITHIN THE PAST 12 MONTHS, THE FOOD YOU BOUGHT JUST DIDN'T LAST AND YOU DIDN'T HAVE MONEY TO GET MORE.: NEVER TRUE

## 2024-10-16 ASSESSMENT — ENCOUNTER SYMPTOMS
CONSTIPATION: 0
BACK PAIN: 0
BLOOD IN STOOL: 0
ABDOMINAL DISTENTION: 0
COUGH: 0
SHORTNESS OF BREATH: 0
CHEST TIGHTNESS: 0
NAUSEA: 0
VOMITING: 0
DIARRHEA: 0
WHEEZING: 0
ABDOMINAL PAIN: 0

## 2024-10-16 ASSESSMENT — PATIENT HEALTH QUESTIONNAIRE - PHQ9
9. THOUGHTS THAT YOU WOULD BE BETTER OFF DEAD, OR OF HURTING YOURSELF: NOT AT ALL
1. LITTLE INTEREST OR PLEASURE IN DOING THINGS: NOT AT ALL
2. FEELING DOWN, DEPRESSED OR HOPELESS: NOT AT ALL
4. FEELING TIRED OR HAVING LITTLE ENERGY: NOT AT ALL
5. POOR APPETITE OR OVEREATING: NOT AT ALL
3. TROUBLE FALLING OR STAYING ASLEEP: NOT AT ALL
SUM OF ALL RESPONSES TO PHQ QUESTIONS 1-9: 0
6. FEELING BAD ABOUT YOURSELF - OR THAT YOU ARE A FAILURE OR HAVE LET YOURSELF OR YOUR FAMILY DOWN: NOT AT ALL
7. TROUBLE CONCENTRATING ON THINGS, SUCH AS READING THE NEWSPAPER OR WATCHING TELEVISION: NOT AT ALL
10. IF YOU CHECKED OFF ANY PROBLEMS, HOW DIFFICULT HAVE THESE PROBLEMS MADE IT FOR YOU TO DO YOUR WORK, TAKE CARE OF THINGS AT HOME, OR GET ALONG WITH OTHER PEOPLE: NOT DIFFICULT AT ALL
SUM OF ALL RESPONSES TO PHQ QUESTIONS 1-9: 0
8. MOVING OR SPEAKING SO SLOWLY THAT OTHER PEOPLE COULD HAVE NOTICED. OR THE OPPOSITE, BEING SO FIGETY OR RESTLESS THAT YOU HAVE BEEN MOVING AROUND A LOT MORE THAN USUAL: NOT AT ALL
SUM OF ALL RESPONSES TO PHQ QUESTIONS 1-9: 0
SUM OF ALL RESPONSES TO PHQ9 QUESTIONS 1 & 2: 0
SUM OF ALL RESPONSES TO PHQ QUESTIONS 1-9: 0

## 2024-10-16 NOTE — TELEPHONE ENCOUNTER
Voicemail was left from  office wanting to know if pt needs cleearance for upcoming procedure. Call back #[phone]

## 2024-10-16 NOTE — PROGRESS NOTES
Micheline Arguelles (:  1960) is a 64 y.o. female,Established patient, here for evaluation of the following chief complaint(s): Surgical Clearance (COLONOSCOPY/WILL NOT HAVE DATED UNTIL CLEARED BY PCP/CARDIOLOGIST/DID SEE CARDIOLOGIST YESTERDAY)      ASSESSMENT/PLAN:    Micheline received counseling on the following healthy behaviors: nutrition, exercise, and medication adherence  Reviewed prior labs and health maintenance  Discussed use, benefit, and side effects of prescribed medications.   Barriers to medication compliance addressed.   Patient given educational materials - see patient instructions  All patient questions answered.  Patient voiced understanding.  The patient's past medical,surgical, social, and family history as well as her current medications and allergies were reviewed as documented in today's encounter.    Medications, labs, diagnostic studies, consultations and follow-up as documented in this encounter.    Micheline was seen today for surgical clearance.    Diagnoses and all orders for this visit:    Hypothyroidism, unspecified type  -Seems to be controlled according to recent blood work  -Plan to repeat blood work  -No change in plan of care for now  -     TSH; Future  -     T4, Free; Future    Need for influenza vaccination  -     Influenza, FLUCELVAX Trivalent, (age 6 mo+) IM, Preservative Free, 0.5mL    Pre-op testing  -     Basic Metabolic Panel; Future  -     CBC; Future  -     Hemoglobin A1C; Future  -     Magnesium; Future  -     Urinalysis with Reflex to Culture; Future  -     XR CHEST STANDARD (2 VW); Future    Hyperglycemia  -Unsure if improved or not  -Lab work ordered  -     Hemoglobin A1C; Future    Essential hypertension  -Seems to be well-controlled  -Continue current regimen  -Continue to follow with cardiology    Atrial fibrillation with rapid ventricular response (HCC)  -Seems to be well-controlled  -Continue current regimen  -Continue to follow with cardiology    Prior to 
Left message for GI office   
Discontinued    HIV screen  Discontinued    Cervical cancer screen  Discontinued

## 2024-10-17 NOTE — TELEPHONE ENCOUNTER
Per alternative TE- pt is calling in regards to scheduling colonoscopy with Dr Ochoa. Patient seen in office as NP on 8/27/24, order for colonoscopy is placed

## 2024-10-28 ENCOUNTER — HOSPITAL ENCOUNTER (OUTPATIENT)
Age: 64
Discharge: HOME | End: 2024-10-28
Payer: MEDICARE

## 2024-10-28 ENCOUNTER — HOSPITAL ENCOUNTER (OUTPATIENT)
Age: 64
Discharge: HOME | End: 2024-10-30
Payer: MEDICARE

## 2024-10-28 ENCOUNTER — HOSPITAL ENCOUNTER (OUTPATIENT)
Dept: GENERAL RADIOLOGY | Age: 64
Discharge: HOME | End: 2024-10-30
Payer: MEDICARE

## 2024-10-28 DIAGNOSIS — Z01.818 PRE-OP TESTING: ICD-10-CM

## 2024-10-28 DIAGNOSIS — R73.9 HYPERGLYCEMIA: ICD-10-CM

## 2024-10-28 DIAGNOSIS — E03.9 HYPOTHYROIDISM, UNSPECIFIED TYPE: ICD-10-CM

## 2024-10-28 LAB
ANION GAP SERPL CALCULATED.3IONS-SCNC: 10 MMOL/L (ref 9–16)
ANION GAP SERPL CALCULATED.3IONS-SCNC: 9 MMOL/L (ref 9–16)
BACTERIA URNS QL MICRO: ABNORMAL
BILIRUB UR QL STRIP: NEGATIVE
BUN SERPL-MCNC: 25 MG/DL (ref 8–23)
BUN SERPL-MCNC: 25 MG/DL (ref 8–23)
CALCIUM SERPL-MCNC: 8.6 MG/DL (ref 8.6–10.4)
CALCIUM SERPL-MCNC: 9 MG/DL (ref 8.6–10.4)
CASTS #/AREA URNS LPF: ABNORMAL /LPF
CHLORIDE SERPL-SCNC: 101 MMOL/L (ref 98–107)
CHLORIDE SERPL-SCNC: 101 MMOL/L (ref 98–107)
CLARITY UR: ABNORMAL
CO2 SERPL-SCNC: 29 MMOL/L (ref 20–31)
CO2 SERPL-SCNC: 30 MMOL/L (ref 20–31)
COLOR UR: YELLOW
CREAT SERPL-MCNC: 1.2 MG/DL (ref 0.7–1.2)
CREAT SERPL-MCNC: 1.2 MG/DL (ref 0.7–1.2)
EPI CELLS #/AREA URNS HPF: ABNORMAL /HPF
ERYTHROCYTE [DISTWIDTH] IN BLOOD BY AUTOMATED COUNT: 14.3 % (ref 11.5–14.9)
EST. AVERAGE GLUCOSE BLD GHB EST-MCNC: 91 MG/DL
GFR, ESTIMATED: 50 ML/MIN/1.73M2
GFR, ESTIMATED: 50 ML/MIN/1.73M2
GLUCOSE SERPL-MCNC: 106 MG/DL (ref 74–99)
GLUCOSE SERPL-MCNC: 112 MG/DL (ref 74–99)
GLUCOSE UR STRIP-MCNC: NEGATIVE MG/DL
HBA1C MFR BLD: 4.8 % (ref 4–6)
HCT VFR BLD AUTO: 35.7 % (ref 36–46)
HGB BLD-MCNC: 12.1 G/DL (ref 12–16)
HGB UR QL STRIP.AUTO: NEGATIVE
KETONES UR STRIP-MCNC: NEGATIVE MG/DL
LEUKOCYTE ESTERASE UR QL STRIP: ABNORMAL
MAGNESIUM SERPL-MCNC: 2.2 MG/DL (ref 1.6–2.4)
MAGNESIUM SERPL-MCNC: 2.4 MG/DL (ref 1.6–2.4)
MCH RBC QN AUTO: 30.2 PG (ref 26–34)
MCHC RBC AUTO-ENTMCNC: 33.7 G/DL (ref 31–37)
MCV RBC AUTO: 89.7 FL (ref 80–100)
NITRITE UR QL STRIP: POSITIVE
PH UR STRIP: 7 [PH] (ref 5–8)
PLATELET # BLD AUTO: 245 K/UL (ref 150–450)
PMV BLD AUTO: 7.9 FL (ref 6–12)
POTASSIUM SERPL-SCNC: 3.9 MMOL/L (ref 3.7–5.3)
POTASSIUM SERPL-SCNC: 4 MMOL/L (ref 3.7–5.3)
PROT UR STRIP-MCNC: ABNORMAL MG/DL
RBC # BLD AUTO: 3.99 M/UL (ref 4–5.2)
RBC #/AREA URNS HPF: ABNORMAL /HPF
SODIUM SERPL-SCNC: 140 MMOL/L (ref 136–145)
SODIUM SERPL-SCNC: 140 MMOL/L (ref 136–145)
SP GR UR STRIP: 1.02 (ref 1–1.03)
T4 FREE SERPL-MCNC: 1.8 NG/DL (ref 0.9–1.7)
TSH SERPL DL<=0.05 MIU/L-ACNC: 2.07 UIU/ML (ref 0.27–4.2)
UROBILINOGEN UR STRIP-ACNC: NORMAL EU/DL (ref 0–1)
WBC #/AREA URNS HPF: ABNORMAL /HPF
WBC OTHER # BLD: 6.9 K/UL (ref 3.5–11)

## 2024-10-28 PROCEDURE — 81001 URINALYSIS AUTO W/SCOPE: CPT

## 2024-10-28 PROCEDURE — 87088 URINE BACTERIA CULTURE: CPT

## 2024-10-28 PROCEDURE — 85027 COMPLETE CBC AUTOMATED: CPT

## 2024-10-28 PROCEDURE — 80048 BASIC METABOLIC PNL TOTAL CA: CPT

## 2024-10-28 PROCEDURE — 83036 HEMOGLOBIN GLYCOSYLATED A1C: CPT

## 2024-10-28 PROCEDURE — 87077 CULTURE AEROBIC IDENTIFY: CPT

## 2024-10-28 PROCEDURE — 84443 ASSAY THYROID STIM HORMONE: CPT

## 2024-10-28 PROCEDURE — 71046 X-RAY EXAM CHEST 2 VIEWS: CPT

## 2024-10-28 PROCEDURE — 83735 ASSAY OF MAGNESIUM: CPT

## 2024-10-28 PROCEDURE — 87086 URINE CULTURE/COLONY COUNT: CPT

## 2024-10-28 PROCEDURE — 36415 COLL VENOUS BLD VENIPUNCTURE: CPT

## 2024-10-28 PROCEDURE — 87186 SC STD MICRODIL/AGAR DIL: CPT

## 2024-10-28 PROCEDURE — 84439 ASSAY OF FREE THYROXINE: CPT

## 2024-10-29 DIAGNOSIS — N39.0 URINARY TRACT INFECTION WITHOUT HEMATURIA, SITE UNSPECIFIED: Primary | ICD-10-CM

## 2024-10-29 RX ORDER — SULFAMETHOXAZOLE AND TRIMETHOPRIM 800; 160 MG/1; MG/1
1 TABLET ORAL 2 TIMES DAILY
Qty: 14 TABLET | Refills: 0 | Status: SHIPPED | OUTPATIENT
Start: 2024-10-29 | End: 2024-11-05

## 2024-10-29 NOTE — RESULT ENCOUNTER NOTE
Please notify patient that she does look to have a urinary tract infection, this will need treated before any procedures can take place.  Will start her on antibiotic, awaiting urine culture, can adjust if needed.    T4 just a little high but TSH is normal.  Will keep an eye on this.    Other labs look to be at baseline.

## 2024-10-30 LAB
MICROORGANISM SPEC CULT: ABNORMAL
SPECIMEN DESCRIPTION: ABNORMAL

## 2024-12-04 ENCOUNTER — TELEPHONE (OUTPATIENT)
Dept: FAMILY MEDICINE CLINIC | Age: 64
End: 2024-12-04

## 2024-12-04 NOTE — TELEPHONE ENCOUNTER
Patient called in and said she woke up with pain under her right breast, felt like someone was stepping on her. She said she doesn't think its her heart. She checked her BP it was 152/62 and her apple watch said her pulse was between 62-67. She said she thinks she is coming down with covid and wanted me to ask you if you advise her to go to the ER... She is not SOB.

## 2024-12-04 NOTE — TELEPHONE ENCOUNTER
If chest pain is not improving, she experiences any dizziness or lightheadedness or shortness of breath or if her blood pressure remains elevated then yes I do believe she should be evaluated in the emergency department.  She could always take a COVID test, and let me know if it is positive as we can prescribe treatment for her.

## 2024-12-04 NOTE — TELEPHONE ENCOUNTER
Called patient back and let her know, she said she will recheck her BP in 20 Minutes and if it doesn't get better she will go to the ER.

## 2024-12-05 ENCOUNTER — HOSPITAL ENCOUNTER (OUTPATIENT)
Age: 64
Discharge: HOME | End: 2024-12-05
Payer: MEDICARE

## 2024-12-05 LAB
ANION GAP SERPL CALCULATED.3IONS-SCNC: 8 MMOL/L (ref 9–16)
BUN SERPL-MCNC: 19 MG/DL (ref 8–23)
CALCIUM SERPL-MCNC: 8.6 MG/DL (ref 8.6–10.4)
CHLORIDE SERPL-SCNC: 105 MMOL/L (ref 98–107)
CO2 SERPL-SCNC: 28 MMOL/L (ref 20–31)
CREAT SERPL-MCNC: 1.2 MG/DL (ref 0.7–1.2)
GFR, ESTIMATED: 50 ML/MIN/1.73M2
GLUCOSE SERPL-MCNC: 100 MG/DL (ref 74–99)
MAGNESIUM SERPL-MCNC: 2.2 MG/DL (ref 1.6–2.4)
POTASSIUM SERPL-SCNC: 4.2 MMOL/L (ref 3.7–5.3)
SODIUM SERPL-SCNC: 141 MMOL/L (ref 136–145)

## 2024-12-05 PROCEDURE — 36415 COLL VENOUS BLD VENIPUNCTURE: CPT

## 2024-12-05 PROCEDURE — 80048 BASIC METABOLIC PNL TOTAL CA: CPT

## 2024-12-05 PROCEDURE — 83735 ASSAY OF MAGNESIUM: CPT

## 2024-12-11 ENCOUNTER — HOSPITAL ENCOUNTER (OUTPATIENT)
Dept: WOMENS IMAGING | Age: 64
Discharge: HOME | End: 2024-12-13
Payer: MEDICARE

## 2024-12-11 VITALS — BODY MASS INDEX: 63.21 KG/M2 | WEIGHT: 293 LBS | HEIGHT: 57 IN

## 2024-12-11 DIAGNOSIS — R92.8 ABNORMAL MAMMOGRAM: Primary | ICD-10-CM

## 2024-12-11 DIAGNOSIS — Z12.31 ENCOUNTER FOR SCREENING MAMMOGRAM FOR MALIGNANT NEOPLASM OF BREAST: ICD-10-CM

## 2024-12-11 PROCEDURE — 77063 BREAST TOMOSYNTHESIS BI: CPT

## 2024-12-11 NOTE — RESULT ENCOUNTER NOTE
Please notify patient that there was some asymmetry noted of the left breast, recommending diagnostic mammogram and ultrasound I will place orders for this.

## 2025-01-02 ENCOUNTER — HOSPITAL ENCOUNTER (OUTPATIENT)
Dept: WOMENS IMAGING | Age: 65
Discharge: HOME | End: 2025-01-04
Payer: MEDICARE

## 2025-01-02 DIAGNOSIS — R92.8 ABNORMAL MAMMOGRAM: ICD-10-CM

## 2025-01-02 PROCEDURE — G0279 TOMOSYNTHESIS, MAMMO: HCPCS

## 2025-03-04 DIAGNOSIS — E55.9 VITAMIN D DEFICIENCY: ICD-10-CM

## 2025-03-05 RX ORDER — ERGOCALCIFEROL 1.25 MG/1
50000 CAPSULE, LIQUID FILLED ORAL WEEKLY
Qty: 12 CAPSULE | Refills: 1 | Status: SHIPPED | OUTPATIENT
Start: 2025-03-05

## 2025-03-05 NOTE — TELEPHONE ENCOUNTER
Please Approve or Refuse.  Send to Pharmacy per Pt's Request:      Next Visit Date:  4/16/2025   Last Visit Date: 10/16/2024    Hemoglobin A1C (%)   Date Value   10/28/2024 4.8   04/30/2024 4.8             ( goal A1C is < 7)   BP Readings from Last 3 Encounters:   10/16/24 124/78   08/27/24 (!) 147/70   08/13/24 112/80          (goal 120/80)  BUN   Date Value Ref Range Status   12/05/2024 19 8 - 23 mg/dL Final     Creatinine   Date Value Ref Range Status   12/05/2024 1.2 0.7 - 1.2 mg/dL Final     Potassium   Date Value Ref Range Status   12/05/2024 4.2 3.7 - 5.3 mmol/L Final

## 2025-03-09 ENCOUNTER — HOSPITAL ENCOUNTER (EMERGENCY)
Age: 65
Discharge: HOME | End: 2025-03-09
Attending: EMERGENCY MEDICINE
Payer: MEDICARE

## 2025-03-09 ENCOUNTER — APPOINTMENT (OUTPATIENT)
Dept: GENERAL RADIOLOGY | Age: 65
End: 2025-03-09
Payer: MEDICARE

## 2025-03-09 VITALS
DIASTOLIC BLOOD PRESSURE: 54 MMHG | HEART RATE: 74 BPM | OXYGEN SATURATION: 95 % | WEIGHT: 278 LBS | TEMPERATURE: 98.7 F | RESPIRATION RATE: 17 BRPM | BODY MASS INDEX: 59.98 KG/M2 | SYSTOLIC BLOOD PRESSURE: 166 MMHG | HEIGHT: 57 IN

## 2025-03-09 DIAGNOSIS — M19.011 PRIMARY OSTEOARTHRITIS OF RIGHT SHOULDER: Primary | ICD-10-CM

## 2025-03-09 PROCEDURE — 99284 EMERGENCY DEPT VISIT MOD MDM: CPT

## 2025-03-09 PROCEDURE — 73030 X-RAY EXAM OF SHOULDER: CPT

## 2025-03-09 PROCEDURE — 96372 THER/PROPH/DIAG INJ SC/IM: CPT

## 2025-03-09 PROCEDURE — 6360000002 HC RX W HCPCS

## 2025-03-09 RX ORDER — KETOROLAC TROMETHAMINE 30 MG/ML
30 INJECTION, SOLUTION INTRAMUSCULAR; INTRAVENOUS ONCE
Status: COMPLETED | OUTPATIENT
Start: 2025-03-09 | End: 2025-03-09

## 2025-03-09 RX ADMIN — KETOROLAC TROMETHAMINE 30 MG: 30 INJECTION, SOLUTION INTRAMUSCULAR at 16:43

## 2025-03-09 ASSESSMENT — LIFESTYLE VARIABLES
HOW MANY STANDARD DRINKS CONTAINING ALCOHOL DO YOU HAVE ON A TYPICAL DAY: PATIENT DOES NOT DRINK
HOW OFTEN DO YOU HAVE A DRINK CONTAINING ALCOHOL: NEVER

## 2025-03-09 ASSESSMENT — PAIN DESCRIPTION - ORIENTATION: ORIENTATION: RIGHT

## 2025-03-09 ASSESSMENT — PAIN - FUNCTIONAL ASSESSMENT: PAIN_FUNCTIONAL_ASSESSMENT: 0-10

## 2025-03-09 ASSESSMENT — PAIN DESCRIPTION - LOCATION: LOCATION: SHOULDER

## 2025-03-09 ASSESSMENT — PAIN SCALES - GENERAL: PAINLEVEL_OUTOF10: 6

## 2025-03-09 NOTE — DISCHARGE INSTRUCTIONS
You have osteoarthritis of the right shoulder.  Recommend follow-up with orthopedic surgeon, contact information is provided below.  For pain you may take ibuprofen or Tylenol, follow dosing instructions on back of bottle.

## 2025-03-09 NOTE — ED PROVIDER NOTES
[facility] EMERGENCY DEPARTMENT  eMERGENCY dEPARTMENT eNCOUnter   Attending Attestation     Pt Name: Micheline Arguelles  MRN: [id number]  Birthdate 1960  Date of evaluation: 3/9/25       Micheline Arguelles is a 64 y.o. female who presents with Shoulder Pain (Right shoulder pain x1 month. Pain gets better with icy hot. Patient reports she thought it was her arthritis. Patient took ibuprofen around 7am.)      History:   Patient is here complaining of right shoulder pain for the last month.  Patient has arthritis in that shoulder and it does bother her sometimes but it has been worse since she got had dropped a gallon of milk and felt a pull.    Exam: Vitals:   Vitals:    03/09/25 1624 03/09/25 1625   BP:  (!) 166/54   Pulse: 74    Resp: 17    Temp: 98.7 °F (37.1 °C)    TempSrc: Oral    SpO2: 95%    Weight: 126.1 kg (278 lb)    Height: 1.448 m (4' 9\")      Tenderness palpation with decreased range of motion in the glenohumeral joint and posterior.    I performed a history and physical examination of the patient and discussed management with the resident. I reviewed the resident’s note and agree with the documented findings and plan of care. Any areas of disagreement are noted on the chart. I was personally present for the key portions of any procedures. I have documented in the chart those procedures where I was not present during the key portions. I have personally reviewed all images and agree with the resident's interpretation. I have reviewed the emergency nurses triage note. I agree with the chief complaint, past medical history, past surgical history, allergies, medications, social and family history as documented unless otherwise noted below. Documentation of the HPI, Physical Exam and Medical Decision Making performed by medical students or scribes is based on my personal performance of the HPI, PE and MDM. I personally evaluated and examined the patient in conjunction with the APC and agree with the

## 2025-03-09 NOTE — ED PROVIDER NOTES
[facility] EMERGENCY DEPARTMENT  Emergency Department Encounter  Emergency Medicine Resident     Pt Name:Micheline Arguelles  MRN: [id number]  Birthdate 1960  Date of evaluation: 3/9/25  PCP:  Seymour Corado APRN - CNP  Note Started: 4:23 PM EDT      CHIEF COMPLAINT       Chief Complaint   Patient presents with    Shoulder Pain     Right shoulder pain x1 month. Pain gets better with icy hot. Patient reports she thought it was her arthritis. Patient took ibuprofen around 7am.       HISTORY OF PRESENT ILLNESS  (Location/Symptom, Timing/Onset, Context/Setting, Quality, Duration, Modifying Factors, Severity.)      Micheline Arguelles is a 64 y.o. female who presents with right shoulder pain for the last month.  Patient states she has a history of osteoarthritis and believes it is affecting her shoulder.  She has pain particularly with flexion and abduction of the shoulder.  She denies any loss of sensation of the right upper extremity.  No recent falls or traumatic injury.  Range of motion remains intact.  No fever or rash.    PAST MEDICAL / SURGICAL / SOCIAL / FAMILY HISTORY      has a past medical history of Adjustment disorder with mixed anxiety and depressed mood, Arthritis, Atrial fibrillation (HCC), Cataracts, bilateral, Depression, H/O vitamin D deficiency, Hyperlipidemia, Hypertension, Thyroid disease, Urinary incontinence, Urinary urgency, and Wears glasses.       has a past surgical history that includes Knee arthroscopy (Right, 2007); Dilation and curettage of uterus (2009); Revision Total Knee Arthroplasty (3/24/2014); Appendectomy (1988); Hysterectomy (2009); joint replacement (Bilateral, 03/2015); eye surgery (Bilateral, 02/2016); Cystocopy; Cystometrogram (N/A, 4/6/2017); and Cystoscopy (4/6/2017).      Social History     Socioeconomic History    Marital status:      Spouse name: Not on file    Number of children: Not on file    Years of education: Not on file    Highest education level: Not

## 2025-03-31 ENCOUNTER — OFFICE VISIT (OUTPATIENT)
Dept: ORTHOPEDIC SURGERY | Age: 65
End: 2025-03-31
Payer: MEDICARE

## 2025-03-31 VITALS — WEIGHT: 278 LBS | BODY MASS INDEX: 59.98 KG/M2 | HEIGHT: 57 IN

## 2025-03-31 DIAGNOSIS — M25.511 RIGHT SHOULDER PAIN, UNSPECIFIED CHRONICITY: Primary | ICD-10-CM

## 2025-03-31 PROCEDURE — G8399 PT W/DXA RESULTS DOCUMENT: HCPCS

## 2025-03-31 PROCEDURE — 99203 OFFICE O/P NEW LOW 30 MIN: CPT | Performed by: STUDENT IN AN ORGANIZED HEALTH CARE EDUCATION/TRAINING PROGRAM

## 2025-03-31 PROCEDURE — 1090F PRES/ABSN URINE INCON ASSESS: CPT

## 2025-03-31 PROCEDURE — 1036F TOBACCO NON-USER: CPT

## 2025-03-31 PROCEDURE — 3017F COLORECTAL CA SCREEN DOC REV: CPT

## 2025-03-31 PROCEDURE — G8427 DOCREV CUR MEDS BY ELIG CLIN: HCPCS

## 2025-03-31 PROCEDURE — 20610 DRAIN/INJ JOINT/BURSA W/O US: CPT

## 2025-03-31 PROCEDURE — 1125F AMNT PAIN NOTED PAIN PRSNT: CPT | Performed by: STUDENT IN AN ORGANIZED HEALTH CARE EDUCATION/TRAINING PROGRAM

## 2025-03-31 PROCEDURE — 1123F ACP DISCUSS/DSCN MKR DOCD: CPT | Performed by: STUDENT IN AN ORGANIZED HEALTH CARE EDUCATION/TRAINING PROGRAM

## 2025-03-31 PROCEDURE — G8417 CALC BMI ABV UP PARAM F/U: HCPCS

## 2025-03-31 PROCEDURE — 1159F MED LIST DOCD IN RCRD: CPT | Performed by: STUDENT IN AN ORGANIZED HEALTH CARE EDUCATION/TRAINING PROGRAM

## 2025-03-31 RX ORDER — METHYLPREDNISOLONE ACETATE 80 MG/ML
80 INJECTION, SUSPENSION INTRA-ARTICULAR; INTRALESIONAL; INTRAMUSCULAR; SOFT TISSUE ONCE
Status: COMPLETED | OUTPATIENT
Start: 2025-03-31 | End: 2025-03-31

## 2025-03-31 RX ORDER — DICLOFENAC SODIUM 75 MG/1
75 TABLET, DELAYED RELEASE ORAL 2 TIMES DAILY
Qty: 60 TABLET | Refills: 2 | Status: SHIPPED | OUTPATIENT
Start: 2025-03-31

## 2025-03-31 RX ORDER — BUPIVACAINE HYDROCHLORIDE 2.5 MG/ML
2 INJECTION, SOLUTION INFILTRATION; PERINEURAL ONCE
Status: COMPLETED | OUTPATIENT
Start: 2025-03-31 | End: 2025-03-31

## 2025-03-31 RX ORDER — MELOXICAM 15 MG/1
15 TABLET ORAL DAILY
Qty: 30 TABLET | Refills: 1 | Status: SHIPPED | OUTPATIENT
Start: 2025-03-31 | End: 2025-03-31 | Stop reason: CLARIF

## 2025-03-31 RX ADMIN — BUPIVACAINE HYDROCHLORIDE 5 MG: 2.5 INJECTION, SOLUTION INFILTRATION; PERINEURAL at 16:05

## 2025-03-31 RX ADMIN — METHYLPREDNISOLONE ACETATE 80 MG: 80 INJECTION, SUSPENSION INTRA-ARTICULAR; INTRALESIONAL; INTRAMUSCULAR; SOFT TISSUE at 16:05

## 2025-03-31 NOTE — PROGRESS NOTES
[facility]  [address]  [place] [address]  Dept Phone: [phone]  Dept Fax: [phone]      Orthopaedic Trauma New Patient      CHIEF COMPLAINT:    Chief Complaint   Patient presents with    New Patient     RT SHOULDER       HISTORY OF PRESENT ILLNESS:    The patient is a 64 y.o. female who is being seen as a new patient for right shoulder pain.  Patient has had right shoulder pain for approximately 3 years.  Denies any inciting or traumatic event.  Indicates that the pain is on the superior aspect the shoulder and will radiate laterally down the arm towards the elbow on occasion.  She states that over the past month and a half she has had an increase in her pain without any inciting event.  She was evaluated in the [place] emergency department where plain films were taken and showed some mild osteoarthritic changes of the AC joint.  Patient states that she has osteoarthritis in her right shoulder.  She was never diagnosed via x-ray or physical exam, but states that she has arthritis in other joints like her knees so she believes that she has it in the shoulder as well.  She denies any numbness or tingling.  She states that the pain and weakness occur with both underhand and overhead activities.  She states that it hurts to sleep on that side.  She states that she occasionally feels tightness in the neck but denies any acute neck pain.  She is right-hand dominant.  She states that she is tried physical therapy and that it did not help her right shoulder.  She has tried over-the-counter medications such as Tylenol and ibuprofen which she states do help.  She is also tried IcyHot which provides her relief for approximately 1 hour.        Past Medical History:    Past Medical History:   Diagnosis Date    Adjustment disorder with mixed anxiety and depressed mood 3/6/2015    Arthritis     BILATERAL KNEES AND HANDS    Atrial fibrillation (HCC) 1/2015    \"stress induced\"

## 2025-04-03 ENCOUNTER — HOSPITAL ENCOUNTER (OUTPATIENT)
Dept: PHYSICAL THERAPY | Age: 65
Setting detail: THERAPIES SERIES
Discharge: HOME | End: 2025-04-03
Attending: STUDENT IN AN ORGANIZED HEALTH CARE EDUCATION/TRAINING PROGRAM
Payer: MEDICARE

## 2025-04-03 PROCEDURE — 97112 NEUROMUSCULAR REEDUCATION: CPT

## 2025-04-03 PROCEDURE — 97162 PT EVAL MOD COMPLEX 30 MIN: CPT

## 2025-04-03 NOTE — CONSULTS
[x] [facility]   Outpatient Rehabilitation & Therapy  [address]  P: [phone]   F: [phone]    Physical Therapy Shoulder Evaluation    Date:  4/3/2025  Patient: Micheline Arguelles  : 1960  MRN: [id number]  Physician: Griffin Gibson DO      Insurance: Medicare / BCBS $0 copay follow medicare guidelines /  visits remaining combined with OT and Speech. [employer].    BENEFITS   Medical Diagnosis: M25.511 (ICD-10-CM) - Right shoulder pain, unspecified chronicity   Rehab Codes:    M25.611 R shoulder stiffness,    M62.81 Weakness    Onset Date: 3/31/25   Next 's appt: 25  Visit Count:    Cancel/No Show: 0/0    Subjective:   HPI:  Hx chronic mild intermittent R shoulder pain due to arthritis.  Increased pain in the last 2 months, believes due to increased crocheting (making baby blankets etc). Saw Ortho. 3/31/25, suspects likely has a degenerative tear of her right rotator cuff. Referred to PT, gave steroid injection. May order MRI if no change. Mild improvement since injection.    CC: R sup-post shoulder pain, intermittent.  Takes longer to complete rimma projects, due to pain, unable to lift gallon of milk due to pain, limits how long she can hold her great granddaughter (1 mo old), avoids lifting baby, holds on L side. Also limits tolerance to playing computer games/mouse.    Symptoms worse with:   lifting, carrying, mouse use, crocheting  Symptoms better with: meds, rest, hot shower  Sleep: [] OK    [x] Disturbed (somewhat better w/ using Voltaren)    Pain:  [x] Yes  [] No Location: R sup-post shoulder pain Pain Rating: (0-10 scale) 2-10/10  Pain altered Tx:  [] Yes  [x] No  Action:    PMHx:    [] Unremarkable               [x] Refer to full medical chart  In EPIC   Past Medical History:   Diagnosis Date    Adjustment disorder with mixed anxiety and depressed mood 3/6/2015    Arthritis     BILATERAL KNEES AND HANDS    Atrial fibrillation (HCC) 2015

## 2025-04-04 NOTE — FLOWSHEET NOTE
grandkids, crocheting  Increase strength in affected UE to 5/5 throughout, in order to allow for improved ability w/ lifting and carrying.   Increase strength in B mid-scapular musculature to 4-5/5 throughout, in order to increase stability and decrease strain on GH joint w/ reaching, lifting, and carrying activities.    Improve postural awareness/correction w/ dynamic activities, in order to improve ability/stability, and therefore decrease associated pain, w/ lifting, household chores, and recreational activities.   Increase function AEB SPADI  </= 15% impairment     Lane w/ HEP in order to maintain gains made with therapy interventions upon discharge.                      Patient goals: stop aching associated w/ crocheting    Pt. Education:  [x] Yes  [] No  [] Reviewed Prior HEP/Ed  Method of Education: [x] Verbal  [] Demo  [] Written  4/8 educated on inc postural awareness  Comprehension of Education:  [] Verbalizes understanding.  [] Demonstrates understanding.  [x] Needs review.  [] Demonstrates/verbalizes HEP/Ed previously given.     Plan: [x] Continue per plan of care.   [] Other:      Treatment Charges: Mins Units Time in/Out   []  Modalities      [x]  Ther Exercise 36 2 [id number]/[id number]   [x]  Manual Therapy 8 1 0912-0920   []  Ther Activities      []  Aquatics      []  Neuromuscular      [] Vasocompression      [] Gait Training      [] Dry needling        [] 1 or 2 muscles        [] 3 or more muscles      []  Other      Total Billable time 44       Time In:0904            Time Out: 0948    Electronically signed by:  Yue Guajardo PTA

## 2025-04-08 ENCOUNTER — HOSPITAL ENCOUNTER (OUTPATIENT)
Dept: PHYSICAL THERAPY | Age: 65
Setting detail: THERAPIES SERIES
Discharge: HOME | End: 2025-04-08
Attending: STUDENT IN AN ORGANIZED HEALTH CARE EDUCATION/TRAINING PROGRAM
Payer: MEDICARE

## 2025-04-08 PROCEDURE — 97110 THERAPEUTIC EXERCISES: CPT

## 2025-04-08 PROCEDURE — 97140 MANUAL THERAPY 1/> REGIONS: CPT

## 2025-04-10 ENCOUNTER — HOSPITAL ENCOUNTER (OUTPATIENT)
Dept: PHYSICAL THERAPY | Age: 65
Setting detail: THERAPIES SERIES
Discharge: HOME | End: 2025-04-10
Attending: STUDENT IN AN ORGANIZED HEALTH CARE EDUCATION/TRAINING PROGRAM
Payer: MEDICARE

## 2025-04-10 NOTE — FLOWSHEET NOTE
[] [facility]  Outpatient Rehabilitation &  Therapy  [address]  [phone]  [phone] [] [facility]  Outpatient Rehabilitation &  Therapy  [address]  P: [phone]  F: [phone] [] [facility]  Outpatient Rehabilitation &  Therapy  [address]  P: [phone]  F: [phone] [] [facility]  Outpatient Rehabilitation &  Therapy  [address]  [phone]  [phone] [] [facility]  Outpatient Rehabilitation &  Therapy  [address]   P: [phone]  F: [phone]  [] [facility]  Outpatient Rehabilitation &  Therapy  [address]  P: [phone]  F: [phone] [] [facility]  Outpatient Rehabilitation &  Therapy  [address]  P: [phone]  F: [phone] [] [facility]  Outpatient Rehabilitation &  Therapy  [address]  P: [phone]  F: [phone] [] [facility]  Outpatient Rehabilitation &  Therapy  [address]  P: [phone]  F: [phone]  [x] [facility] Outpatient Rehabilitation &  Therapy  [address]  P: [phone]  F: [phone]     Therapy Cancel/No Show note    Date: 4/10/2025  Patient: Micheline Arguelles  : 1960  MRN: [id number]    Cancels/No Shows to date: 0    For today's appointment patient:    [x]  Cancelled    [] Rescheduled appointment    [] No-show     Reason given by patient:    []  Patient ill    []  Conflicting appointment    [] No transportation      [] Conflict with work    [] No reason given    [] Weather related    [] COVID-19    [x] Other:      Comments:  Pt canceled this AM reports granddaughter is in the hospital.       [x] Next appointment was confirmed    Electronically signed by: Brooke Menchaca PTA

## 2025-04-15 ENCOUNTER — HOSPITAL ENCOUNTER (OUTPATIENT)
Dept: PHYSICAL THERAPY | Age: 65
Setting detail: THERAPIES SERIES
Discharge: HOME | End: 2025-04-15
Attending: STUDENT IN AN ORGANIZED HEALTH CARE EDUCATION/TRAINING PROGRAM
Payer: MEDICARE

## 2025-04-15 PROCEDURE — 97110 THERAPEUTIC EXERCISES: CPT

## 2025-04-15 PROCEDURE — 97140 MANUAL THERAPY 1/> REGIONS: CPT

## 2025-04-15 NOTE — FLOWSHEET NOTE
[facility]   Outpatient Rehabilitation & Therapy  [address]  P: [phone]   F: [phone]    Physical Therapy Daily Treatment Note      Date:  4/15/2025  Patient Name:  Micheline Arguelles    :  1960  MRN: [id number]  Physician: Griffin Gibson DO                                     Insurance: Medicare / BCBS $0 copay follow medicare guidelines / 60/60 visits remaining combined with OT and Speech. HARD MAX.    BENEFITS   Medical Diagnosis: M25.511 (ICD-10-CM) - Right shoulder pain, unspecified chronicity              Rehab Codes:    M25.611 R shoulder stiffness,    M62.81 Weakness    Onset Date: 3/31/25               Next 's appt: 25  Visit Count: 3/16                                Cancel/No Show: 0/0    Subjective:  4/15 Patient arrived and states she was sore after first visit post evaluation. States she was unable to make last visit due to family obligations. States he was crocheting the other day and noticed increased pain in R shoulder. States she ordered shoulder sleeve with ice pack to be able to ice more efficiently at home. Arrives today with no pain due to being early in AM and states she hasn't done much for the day.     Pain:  [] Yes  [x] No Location:  N/A Pain Rating: (0-10 scale) denies/10  Pain altered Tx:  [] No  [] Yes  Action:  Comments:    Objective:  Modalities:   Precautions:  Exercises:  Exercise       Reps/ Time Weight/ Level Completed  today Comments   Neutral posture w/ all ex's!!           Manual           GH, scap-Tx mobs prn            PA and inferior GH joint mobs 8 mins    x                 Table           Cane flx, abd, ER  10x 5\"    x      scapular protraction 10x 2   x     Scapular shapes cw/ccw 10x2 ea   x Added 4/15                Seated           Postural educ Intro/educ        Retro arm bike           Pulleys - flx, abd, H abd/add, scaption 10x 5\"        Cerv. retractions  10x5\"   x  4/8 in semi reclined   scapular retraction

## 2025-04-16 ENCOUNTER — OFFICE VISIT (OUTPATIENT)
Dept: FAMILY MEDICINE CLINIC | Age: 65
End: 2025-04-16
Payer: MEDICARE

## 2025-04-16 VITALS
TEMPERATURE: 98.3 F | OXYGEN SATURATION: 99 % | DIASTOLIC BLOOD PRESSURE: 65 MMHG | WEIGHT: 284 LBS | BODY MASS INDEX: 61.27 KG/M2 | SYSTOLIC BLOOD PRESSURE: 136 MMHG | HEIGHT: 57 IN | HEART RATE: 69 BPM

## 2025-04-16 DIAGNOSIS — I10 ESSENTIAL HYPERTENSION: Primary | ICD-10-CM

## 2025-04-16 DIAGNOSIS — E55.9 VITAMIN D DEFICIENCY: ICD-10-CM

## 2025-04-16 DIAGNOSIS — R79.0 LOW MAGNESIUM LEVEL: ICD-10-CM

## 2025-04-16 DIAGNOSIS — N18.9 CHRONIC KIDNEY DISEASE, UNSPECIFIED CKD STAGE: ICD-10-CM

## 2025-04-16 DIAGNOSIS — E03.9 HYPOTHYROIDISM, UNSPECIFIED TYPE: ICD-10-CM

## 2025-04-16 DIAGNOSIS — R73.9 HYPERGLYCEMIA: ICD-10-CM

## 2025-04-16 DIAGNOSIS — I48.91 ATRIAL FIBRILLATION WITH RAPID VENTRICULAR RESPONSE (HCC): ICD-10-CM

## 2025-04-16 PROCEDURE — G8427 DOCREV CUR MEDS BY ELIG CLIN: HCPCS | Performed by: NURSE PRACTITIONER

## 2025-04-16 PROCEDURE — 1090F PRES/ABSN URINE INCON ASSESS: CPT | Performed by: NURSE PRACTITIONER

## 2025-04-16 PROCEDURE — 99214 OFFICE O/P EST MOD 30 MIN: CPT | Performed by: NURSE PRACTITIONER

## 2025-04-16 PROCEDURE — G8399 PT W/DXA RESULTS DOCUMENT: HCPCS | Performed by: NURSE PRACTITIONER

## 2025-04-16 PROCEDURE — 1125F AMNT PAIN NOTED PAIN PRSNT: CPT | Performed by: NURSE PRACTITIONER

## 2025-04-16 PROCEDURE — 1159F MED LIST DOCD IN RCRD: CPT | Performed by: NURSE PRACTITIONER

## 2025-04-16 PROCEDURE — 3078F DIAST BP <80 MM HG: CPT | Performed by: NURSE PRACTITIONER

## 2025-04-16 PROCEDURE — 3075F SYST BP GE 130 - 139MM HG: CPT | Performed by: NURSE PRACTITIONER

## 2025-04-16 PROCEDURE — 1123F ACP DISCUSS/DSCN MKR DOCD: CPT | Performed by: NURSE PRACTITIONER

## 2025-04-16 PROCEDURE — G8417 CALC BMI ABV UP PARAM F/U: HCPCS | Performed by: NURSE PRACTITIONER

## 2025-04-16 PROCEDURE — 3017F COLORECTAL CA SCREEN DOC REV: CPT | Performed by: NURSE PRACTITIONER

## 2025-04-16 PROCEDURE — 1160F RVW MEDS BY RX/DR IN RCRD: CPT | Performed by: NURSE PRACTITIONER

## 2025-04-16 PROCEDURE — 1036F TOBACCO NON-USER: CPT | Performed by: NURSE PRACTITIONER

## 2025-04-16 SDOH — ECONOMIC STABILITY: FOOD INSECURITY: WITHIN THE PAST 12 MONTHS, THE FOOD YOU BOUGHT JUST DIDN'T LAST AND YOU DIDN'T HAVE MONEY TO GET MORE.: PATIENT DECLINED

## 2025-04-16 SDOH — ECONOMIC STABILITY: FOOD INSECURITY: WITHIN THE PAST 12 MONTHS, YOU WORRIED THAT YOUR FOOD WOULD RUN OUT BEFORE YOU GOT MONEY TO BUY MORE.: PATIENT DECLINED

## 2025-04-16 ASSESSMENT — ENCOUNTER SYMPTOMS
ABDOMINAL PAIN: 0
DIARRHEA: 0
ABDOMINAL DISTENTION: 0
COUGH: 0
VOMITING: 0
BLOOD IN STOOL: 0
BACK PAIN: 1
WHEEZING: 0
NAUSEA: 0
CONSTIPATION: 0
SHORTNESS OF BREATH: 0
CHEST TIGHTNESS: 0

## 2025-04-16 ASSESSMENT — PATIENT HEALTH QUESTIONNAIRE - PHQ9
SUM OF ALL RESPONSES TO PHQ QUESTIONS 1-9: 0
4. FEELING TIRED OR HAVING LITTLE ENERGY: NOT AT ALL
SUM OF ALL RESPONSES TO PHQ QUESTIONS 1-9: 0
1. LITTLE INTEREST OR PLEASURE IN DOING THINGS: NOT AT ALL
10. IF YOU CHECKED OFF ANY PROBLEMS, HOW DIFFICULT HAVE THESE PROBLEMS MADE IT FOR YOU TO DO YOUR WORK, TAKE CARE OF THINGS AT HOME, OR GET ALONG WITH OTHER PEOPLE: NOT DIFFICULT AT ALL
9. THOUGHTS THAT YOU WOULD BE BETTER OFF DEAD, OR OF HURTING YOURSELF: NOT AT ALL
2. FEELING DOWN, DEPRESSED OR HOPELESS: NOT AT ALL
7. TROUBLE CONCENTRATING ON THINGS, SUCH AS READING THE NEWSPAPER OR WATCHING TELEVISION: NOT AT ALL
6. FEELING BAD ABOUT YOURSELF - OR THAT YOU ARE A FAILURE OR HAVE LET YOURSELF OR YOUR FAMILY DOWN: NOT AT ALL
5. POOR APPETITE OR OVEREATING: NOT AT ALL
SUM OF ALL RESPONSES TO PHQ QUESTIONS 1-9: 0
SUM OF ALL RESPONSES TO PHQ QUESTIONS 1-9: 0
8. MOVING OR SPEAKING SO SLOWLY THAT OTHER PEOPLE COULD HAVE NOTICED. OR THE OPPOSITE, BEING SO FIGETY OR RESTLESS THAT YOU HAVE BEEN MOVING AROUND A LOT MORE THAN USUAL: NOT AT ALL
3. TROUBLE FALLING OR STAYING ASLEEP: NOT AT ALL

## 2025-04-16 NOTE — PROGRESS NOTES
Micheline Arguelles (:  1960) is a 64 y.o. female,Established patient, here for evaluation of the following chief complaint(s): Hypertension and Discuss Medications      ASSESSMENT/PLAN:    Micheline received counseling on the following healthy behaviors: nutrition, exercise, and medication adherence  Reviewed prior labs and health maintenance  Discussed use, benefit, and side effects of prescribed medications.   Barriers to medication compliance addressed.   Patient given educational materials - see patient instructions  All patient questions answered.  Patient voiced understanding.  The patient's past medical,surgical, social, and family history as well as her current medications and allergies were reviewed as documented in today's encounter.    Medications, labs, diagnostic studies, consultations and follow-up as documented in this encounter.    Micheline was seen today for hypertension and discuss medications.    Diagnoses and all orders for this visit:    Essential hypertension  -Controlled  -Continue plan of care  -Follow with cardiology    Atrial fibrillation with rapid ventricular response (HCC)  -Chronic condition  -Controlled  -Continue plan of care  -Follow with cardiology    Hypothyroidism, unspecified type  -Chronic condition  -Unsure if well-controlled or not  -Lab work ordered  -     TSH; Future  -     T4, Free; Future    Vitamin D deficiency  -Unsure if improved or not  -Blood work ordered  -     Vitamin D 25 Hydroxy; Future    Hyperglycemia  -Unsure if improved or not  -Blood work ordered  -     Hemoglobin A1C; Future    Low magnesium level  -Unsure if improved or not  -Blood work ordered  -     Magnesium; Future    Chronic kidney disease, unspecified CKD stage  -Unsure if controlled or not  -Not currently following with nephrology  -Plan to repeat blood work  -     Basic Metabolic Panel; Future    Prior to Visit Medications    Medication Sig Taking? Authorizing Provider   diclofenac (VOLTAREN) 75

## 2025-04-16 NOTE — PROGRESS NOTES
Visit Information    Have you changed or started any medications since your last visit including any over-the-counter medicines, vitamins, or herbal medicines? yes -     Have you stopped taking any of your medications? Is so, why? -  no  Are you having any side effects from any of your medications? - no    Have you seen any other physician or provider since your last visit?  yes -     Have you had any other diagnostic tests since your last visit?  yes -     Have you been seen in the emergency room and/or had an admission in a hospital since we last saw you?  no   Have you had your routine dental cleaning in the past 6 months?  no     Do you have an active MyChart account? If no, what is the barrier?  Yes    Patient Care Team:  Seymour Corado APRN - CNP as PCP - General (Nurse Practitioner)  Seymour Corado APRN - CNP as PCP - Empaneled Provider    Medical History Review  Past Medical, Family, and Social History reviewed and does contribute to the patient presenting condition    Health Maintenance   Topic Date Due    Annual Wellness Visit (Medicare)  08/14/2025    COVID-19 Vaccine (8 - 2024-25 season) 09/07/2025    Depression Monitoring  10/16/2025    GFR test (Diabetes, CKD 3-4, OR last GFR 15-59)  12/05/2025    Breast cancer screen  01/02/2027    Lipids  04/30/2029    DTaP/Tdap/Td vaccine (2 - Td or Tdap) 05/06/2032    Colorectal Cancer Screen  10/17/2034    DEXA (modify frequency per FRAX score)  Completed    Flu vaccine  Completed    Shingles vaccine  Completed    Pneumococcal 50+ years Vaccine  Completed    Respiratory Syncytial Virus (RSV) Pregnant or age 60 yrs+  Completed    Hepatitis C screen  Completed    Hepatitis A vaccine  Aged Out    Hepatitis B vaccine  Aged Out    Hib vaccine  Aged Out    Polio vaccine  Aged Out    Meningococcal (ACWY) vaccine  Aged Out    Meningococcal B vaccine  Aged Out    Depression Screen  Discontinued    Pneumococcal 0-49 years Vaccine  Discontinued    Diabetes screen  Discontinued

## 2025-04-17 ENCOUNTER — PREP FOR PROCEDURE (OUTPATIENT)
Dept: GASTROENTEROLOGY | Age: 65
End: 2025-04-17

## 2025-04-17 ENCOUNTER — HOSPITAL ENCOUNTER (OUTPATIENT)
Dept: PHYSICAL THERAPY | Age: 65
Setting detail: THERAPIES SERIES
Discharge: HOME | End: 2025-04-17
Attending: STUDENT IN AN ORGANIZED HEALTH CARE EDUCATION/TRAINING PROGRAM
Payer: MEDICARE

## 2025-04-17 DIAGNOSIS — Z12.11 SCREENING FOR COLORECTAL CANCER: ICD-10-CM

## 2025-04-17 DIAGNOSIS — Z12.12 SCREENING FOR COLORECTAL CANCER: ICD-10-CM

## 2025-04-17 DIAGNOSIS — Z12.11 SCREEN FOR COLON CANCER: ICD-10-CM

## 2025-04-17 PROCEDURE — 97110 THERAPEUTIC EXERCISES: CPT

## 2025-04-17 PROCEDURE — 97140 MANUAL THERAPY 1/> REGIONS: CPT

## 2025-04-17 NOTE — FLOWSHEET NOTE
[x] Patient would continue to benefit from skilled physical therapy services in order to: decrease pain, improve posture, increase ROM, increase strength, and improve function.     STG: (to be met in 8 treatments)  Decrease/centralize pain/symptoms by >/= 50% per pt report, in order to increase ability/tolerance w/ crocheting, sleeping    Increase AROM in affected shoulder to >/= 140 degrees flx, 120 abd, ER >/= 1\" > UT, IR to L5, in order to increase mobility for dressing, doing hair    Independent with Home Exercise Programs     LTG: (to be met in 16 treatments)  Decrease pain to 0-3/10 and decrease frequency, in order to increase tolerance/ability w/ lifting gallon of milk/water, interacting w/ grandkids, crocheting  Increase strength in affected UE to 5/5 throughout, in order to allow for improved ability w/ lifting and carrying.   Increase strength in B mid-scapular musculature to 4-5/5 throughout, in order to increase stability and decrease strain on GH joint w/ reaching, lifting, and carrying activities.    Improve postural awareness/correction w/ dynamic activities, in order to improve ability/stability, and therefore decrease associated pain, w/ lifting, household chores, and recreational activities.   Increase function AEB SPADI  </= 15% impairment     [place] w/ HEP in order to maintain gains made with therapy interventions upon discharge.                      Patient goals: stop aching associated w/ crocheting    Pt. Education:  [x] Yes  [] No  [] Reviewed Prior HEP/Ed  Method of Education: [x] Verbal  [] Demo  [] Written  4/15 see above  Comprehension of Education:  [] Verbalizes understanding.  [] Demonstrates understanding.  [x] Needs review.  [] Demonstrates/verbalizes HEP/Ed previously given.   Access Code: [email or web address]  URL: [email or web address]  Date: 04/15/2025  Prepared by: Myesha Mar    Exercises  - Supine Shoulder Flexion Extension AAROM with Dowel  - 1 x daily - 7 x weekly

## 2025-04-22 ENCOUNTER — HOSPITAL ENCOUNTER (OUTPATIENT)
Dept: PHYSICAL THERAPY | Age: 65
Setting detail: THERAPIES SERIES
Discharge: HOME | End: 2025-04-22
Attending: STUDENT IN AN ORGANIZED HEALTH CARE EDUCATION/TRAINING PROGRAM
Payer: MEDICARE

## 2025-04-22 PROCEDURE — 97140 MANUAL THERAPY 1/> REGIONS: CPT

## 2025-04-22 PROCEDURE — 97110 THERAPEUTIC EXERCISES: CPT

## 2025-04-22 PROCEDURE — 97112 NEUROMUSCULAR REEDUCATION: CPT

## 2025-04-22 NOTE — FLOWSHEET NOTE
retraction  10x2   5\" hold         Scap retraction w/B ER 10x2  5\" hold      added 4/15     H. Abd w/ tband  10-15x  yellow              Standing           Tball H add/abd           Spine neutral:           Tband rows  15x red x Added 4/22   Tband ext  15x red x Added 4/22   Door way stretch 2x30\"  x 4/17 pt demo and verbally added for HEP    Ball wall cw/ccw 10-15x ea   x Added 4/22   Belt/towel IR stretch  5x10\"   x Added 4/22   Cross arm stretch 2-3x30\"   x  4/17 pt demo and verbally added for HEP               Semi prone vs prone vs bent over: ??         Rows R, L           Shoulder ext R, L            H abd/MT R, L           Other:     AROM R shoulder  (Degrees) 4/3/25 4/22/25   Flx 130 140   ABd 115 p 130   IR Iliac crest Posterior surface of iliac crest   ER occiput  --    H add Lacking 3 p 15   p = pain    Specific instructions for next treatment:   - update written HEP if good response/tolerance to new additions this visit     General instructions for treatment:   -Ex’s to increase ROM w/ focus on R shoulder all planes   -Mid-scapular strengthening B  -strengthening w/ focus on B shoulder abd, flx, ER  -Postural educ./strengthening/stretching   -GH mobs, scap-Thoracic mobs prn  -Stretching, w/ focus on pecs  -Mechanical/functional retraining as indicated (crocheting, lifting, pouring, interaction w/ grandkids/great grandkids)   -Modalities prn/if no progress   * May consider MRI if no progress    Assessment:   [x] Progressing toward goals.    [] No change.     [x] Other: Deferred PROM as pt. Is showing progress w/ current ex's. Added new ex's as noted w/ good technique.  Intermittent cueing for posture.   Pt. demos increased AROM across all planes.   Sore after treatment/new ex's, but no increased pain.      [x] Patient would continue to benefit from skilled physical therapy services in order to: decrease pain, improve posture, increase ROM, increase strength, and improve function.     STG: (to be met in

## 2025-04-24 ENCOUNTER — HOSPITAL ENCOUNTER (OUTPATIENT)
Dept: PHYSICAL THERAPY | Age: 65
Setting detail: THERAPIES SERIES
Discharge: HOME | End: 2025-04-24
Attending: STUDENT IN AN ORGANIZED HEALTH CARE EDUCATION/TRAINING PROGRAM
Payer: MEDICARE

## 2025-04-24 PROCEDURE — 97140 MANUAL THERAPY 1/> REGIONS: CPT

## 2025-04-24 PROCEDURE — 97110 THERAPEUTIC EXERCISES: CPT

## 2025-04-24 NOTE — FLOWSHEET NOTE
[facility]   Outpatient Rehabilitation & Therapy  [address]  P: [phone]   F: [phone]    Physical Therapy Daily Treatment Note      Date:  2025  Patient Name:  Micheline Arguelles    :  1960  MRN: [id number]  Physician: Griffin Gibson DO                                     Insurance: Medicare / BCBS $0 copay follow medicare guidelines / 60/60 visits remaining combined with OT and Speech. HARD MAX.    BENEFITS   Medical Diagnosis: M25.511 (ICD-10-CM) - Right shoulder pain, unspecified chronicity              Rehab Codes:    M25.611 R shoulder stiffness,    M62.81 Weakness    Onset Date: 3/31/25               Next 's appt: 25  Visit Count:                                 Cancel/No Show: 1/0    Subjective:   Patient states she slept on her R shoulder last night, so she is a bit sore this morning. Patient reporting overall compliance with HEP, but was unable to do it yesterday due to being busy with family issues. States her pain is bearable enough to where she does not have to take pain medication.   Pain:  [x] Yes  [] No Location:  R lateral shoulder Pain Rating: (0-10 scale) 4/10  Pain altered Tx:  [x] No  [] Yes  Action:  Comments:    Objective:  Modalities:   Precautions: None  Exercises:  Exercise       Reps/ Time Weight/ Level Completed  today Comments   Neutral posture w/ all ex's!!           Manual  8 mins         GH, scap-Tx mobs prn     x   GH mobs (post, inf, ant)    STM to pec                      Table           Cane flx, abd, ER  10x 5\"      TC needed for proper execution    scapular protraction 10x 2        Scapular shapes cw/ccw 10x2 ea    Added 4/15                Seated           Postural educ Intro/educ        Retro arm bike  2 mins L1 x Added    Pulleys - flx, abd, H abd/add, scaption 2'/2'   x  flexion and ABD   Cerv. retractions  10x5\"   x   seated (w/ block under feet)   scapular retraction  10x2   5\" hold

## 2025-04-29 ENCOUNTER — HOSPITAL ENCOUNTER (OUTPATIENT)
Dept: PHYSICAL THERAPY | Age: 65
Setting detail: THERAPIES SERIES
Discharge: HOME | End: 2025-04-29
Attending: STUDENT IN AN ORGANIZED HEALTH CARE EDUCATION/TRAINING PROGRAM
Payer: MEDICARE

## 2025-04-29 PROCEDURE — 97140 MANUAL THERAPY 1/> REGIONS: CPT

## 2025-04-29 PROCEDURE — 97110 THERAPEUTIC EXERCISES: CPT

## 2025-04-29 NOTE — FLOWSHEET NOTE
[facility]   Outpatient Rehabilitation & Therapy  [address]  P: [phone]   F: [phone]    Physical Therapy Daily Treatment Note      Date:  2025  Patient Name:  Micheline Arguelles    :  1960  MRN: [id number]  Physician: Griffin Gibson DO                                     Insurance: Medicare / BCBS $0 copay follow medicare guidelines / 60/60 visits remaining combined with OT and Speech. HARD MAX.    BENEFITS   Medical Diagnosis: M25.511 (ICD-10-CM) - Right shoulder pain, unspecified chronicity              Rehab Codes:    M25.611 R shoulder stiffness,    M62.81 Weakness    Onset Date: 3/31/25               Next 's appt: 25  Visit Count:                                 Cancel/No Show: 1/0    Subjective: Crocheted the other day, was able to complete 1 placemat, then had to stop due to tension in R shoulder. Did take a break during to do some HEP. PT sessions going well. Has not had to take any pain meds x 1 week.  States  notices pt. Isn't complaining about shoulder as much.  Significantly decreased sleep disturbances.    Pain:  [] Yes  [x] No Location:  n/a Pain Rating: (0-10 scale) 0/10  Pain altered Tx:  [x] No  [] Yes  Action:  Comments:    Objective:  Today's treatment:  Precautions: None; may need cues to stay on task/ not get distracted w/ conversation.  Exercises:  Exercise       Reps/ Time Weight/ Level Completed  today Comments   Neutral posture w/ all ex's!!           Manual  8 mins         GH, scap-Tx mobs prn     x   GH mobs (post, inf, ant)    STM to pec                      Table           Cane flx, abd, ER  10x 5\"   x   TC needed for proper execution    scapular protraction 10x 2   x     Scapular shapes cw/ccw 15x ea   x Added 4/15                Seated           Postural educ Intro/educ        Retro arm bike  2 mins L2 x Increased resistance    Pulleys - flx, abd, H abd/add, scaption 2'/2'     flexion and ABD

## 2025-05-01 ENCOUNTER — HOSPITAL ENCOUNTER (OUTPATIENT)
Dept: PHYSICAL THERAPY | Age: 65
Setting detail: THERAPIES SERIES
Discharge: HOME | End: 2025-05-01
Attending: STUDENT IN AN ORGANIZED HEALTH CARE EDUCATION/TRAINING PROGRAM
Payer: MEDICARE

## 2025-05-01 PROCEDURE — 97110 THERAPEUTIC EXERCISES: CPT

## 2025-05-01 PROCEDURE — 97140 MANUAL THERAPY 1/> REGIONS: CPT

## 2025-05-01 NOTE — FLOWSHEET NOTE
[facility]   Outpatient Rehabilitation & Therapy  [address]  P: [phone]   F: [phone]    Physical Therapy Daily Treatment Note      Date:  2025  Patient Name:  Micheline Arguelles    :  1960  MRN: [id number]  Physician: Griffin Gibson DO                                     Insurance: Medicare / BCBS $0 copay follow medicare guidelines / 60/60 visits remaining combined with OT and Speech. HARD MAX.    BENEFITS   Medical Diagnosis: M25.511 (ICD-10-CM) - Right shoulder pain, unspecified chronicity              Rehab Codes:    M25.611 R shoulder stiffness,    M62.81 Weakness    Onset Date: 3/31/25               Next 's appt: 25  Visit Count:                                 Cancel/No Show: 1/0    Subjective: Patient reporting she left with tolerable pain last session.  Pt reporting she has her good days and bad days.    Pain:  [] Yes  [x] No Location:  n/a Pain Rating: (0-10 scale) 0/10  Pain altered Tx:  [x] No  [] Yes  Action:  Comments:    Objective:  Today's treatment:  Precautions: None; may need cues to stay on task/ not get distracted w/ conversation.  Exercises:  Exercise       Reps/ Time Weight/ Level Completed  today Comments   Neutral posture w/ all ex's!!           Manual           GH, scap-Tx mobs prn  8mins   x   GH mobs (post, inf, ant)    STM to pec                      Table           Cane flx, abd, ER  10x 5\"   x   TC needed for proper execution    scapular protraction 10x 2   x     Scapular shapes cw/ccw 15x ea   x Added 4/15                Seated           Postural educ Intro/educ        Retro arm bike  2 mins L2 x Increased resistance    Pulleys - flx, abd, H abd/add, scaption 2'/2'     flexion and ABD   Cerv. retractions  10x5\"      seated (w/ block under feet)   scapular retraction  10x2   5\" hold         Scap retraction w/B ER 10x2  5\" hold      added 4/15     H. Abd w/ tband  10-15x  yellow x

## 2025-05-06 ENCOUNTER — HOSPITAL ENCOUNTER (OUTPATIENT)
Dept: PHYSICAL THERAPY | Age: 65
Setting detail: THERAPIES SERIES
Discharge: HOME | End: 2025-05-06
Attending: STUDENT IN AN ORGANIZED HEALTH CARE EDUCATION/TRAINING PROGRAM
Payer: MEDICARE

## 2025-05-06 PROCEDURE — 97110 THERAPEUTIC EXERCISES: CPT

## 2025-05-06 PROCEDURE — 97140 MANUAL THERAPY 1/> REGIONS: CPT

## 2025-05-06 NOTE — FLOWSHEET NOTE
block under feet)   scapular retraction  10x2   5\" hold         Scap retraction w/B ER 10x2  5\" hold      added 4/15     H. Abd w/ tband  10-15x  yellow              Standing           Tball H add/abd  10x        Spine neutral:           Tband rows  20x red  Added 4/22   Tband ext  15x red  Added 4/22   Door way stretch - R 2x30\"  x 4/17 pt demo and verbally added for HEP    Ball wall cw/ccw 10-15x ea  2#  Added weight 4/24   Belt/towel IR stretch 10x 5\"   x Added 4/22   Cross arm stretch 2-3x30\"     mild pain               Semi prone vs prone vs bent over: add         Rows R, L           Shoulder ext R, L            H abd/MT R, L           Other:     AROM R shoulder  (Degrees) 4/3/25 4/22/25    Flx 130 140    ABd 115 p 130    IR Iliac crest Posterior surface of iliac crest    ER occiput  --     H add Lacking 3 p 15    p = pain    Specific instructions for next treatment:   - follow up re: updated HEP  -recheck AROM for STG  -Add prone/semi prone mid-scapular strength ex's     General instructions for treatment:   -Ex’s to increase ROM w/ focus on R shoulder all planes   -Mid-scapular strengthening B  -strengthening w/ focus on B shoulder abd, flx, ER  -Postural educ./strengthening/stretching   -GH mobs, scap-Thoracic mobs prn  -Stretching, w/ focus on pecs  -Mechanical/functional retraining as indicated (crocheting, lifting, pouring, interaction w/ grandkids/great grandkids)   -Modalities prn/if no progress   * May consider MRI if no progress    Assessment:   [x] Progressing toward goals.  Continued with program as charted above.  Progressed shoulder protraction with adding 1lb dumbbell to improve scapular strength.  Dumbbell was also added to shoulder flexion for ROM and strengthening.  Educated on working within pain free ranges.  Patient continues to be limited with ABD ROM and reports pain at end range.  Patient reporting inc pain at the end of session rating 8/10 pain.      [] No change.     [] Other:

## 2025-05-08 ENCOUNTER — HOSPITAL ENCOUNTER (OUTPATIENT)
Dept: PHYSICAL THERAPY | Age: 65
Setting detail: THERAPIES SERIES
Discharge: HOME | End: 2025-05-08
Attending: STUDENT IN AN ORGANIZED HEALTH CARE EDUCATION/TRAINING PROGRAM
Payer: MEDICARE

## 2025-05-08 PROCEDURE — 97110 THERAPEUTIC EXERCISES: CPT

## 2025-05-08 NOTE — FLOWSHEET NOTE
[facility]   Outpatient Rehabilitation & Therapy  [address]  P: [phone]   F: [phone]    Physical Therapy Daily Treatment Note      Date:  2025  Patient Name:  Micheline Arguelles    :  1960  MRN: [id number]  Physician: Griffin Gibson DO                                     Insurance: Medicare / BCBS $0 copay follow medicare guidelines / 60/60 visits remaining combined with OT and Speech. HARD MAX.    BENEFITS   Medical Diagnosis: M25.511 (ICD-10-CM) - Right shoulder pain, unspecified chronicity              Rehab Codes:    M25.611 R shoulder stiffness,    M62.81 Weakness    Onset Date: 3/31/25               Next 's appt: 25  Visit Count:                                 Cancel/No Show: 1/0    Subjective:  Patient reporting to therapy stating she feels pretty good today.  Patient reporting she started using Volatren for pain and has been trying to ice more which has helped with her pain.      Pain:  [] Yes  [x] No Location:  n/a Pain Rating: (0-10 scale) 0/10  Pain altered Tx:  [x] No  [] Yes  Action:  Comments:    Objective:  Today's treatment:  Precautions: None; may need cues to stay on task/ not get distracted w/ conversation.  Exercises:  Exercise       Reps/ Time Weight/ Level Completed  today Comments   Neutral posture w/ all ex's!!           Manual           GH, scap-Tx mobs prn  4mins     GH mobs (post, inf, ant)    STM to pec 4'                    Table           Cane flx, abd, ER  10x2 5\"  1# jeremias x   TC needed for proper execution   ABD and ER    scapular protraction 10x 2  1# x 5/6 added dumbbell   Scapular shapes cw/ccw 15x ea  1# x Added 4/15   5/6 added dumbbell   Shoulder Flexion  10x 1#  x            Sidelying       Shouder ABD 10x2 1# x 5/8 added   Shoulder ER 10x2 1# x 5/8 added          Seated           Postural educ Intro/educ        Retro arm bike  2 mins L2 x Increased resistance    Pulleys - flx, abd, H abd/add,

## 2025-05-12 ENCOUNTER — HOSPITAL ENCOUNTER (OUTPATIENT)
Age: 65
Setting detail: OUTPATIENT SURGERY
Discharge: HOME | End: 2025-05-12
Attending: INTERNAL MEDICINE | Admitting: INTERNAL MEDICINE
Payer: MEDICARE

## 2025-05-12 ENCOUNTER — ANESTHESIA (OUTPATIENT)
Dept: OPERATING ROOM | Age: 65
End: 2025-05-12
Payer: MEDICARE

## 2025-05-12 ENCOUNTER — ANESTHESIA EVENT (OUTPATIENT)
Dept: OPERATING ROOM | Age: 65
End: 2025-05-12
Payer: MEDICARE

## 2025-05-12 VITALS
RESPIRATION RATE: 22 BRPM | HEART RATE: 63 BPM | DIASTOLIC BLOOD PRESSURE: 71 MMHG | HEIGHT: 57 IN | BODY MASS INDEX: 60.19 KG/M2 | TEMPERATURE: 97.5 F | WEIGHT: 279 LBS | OXYGEN SATURATION: 99 % | SYSTOLIC BLOOD PRESSURE: 108 MMHG

## 2025-05-12 DIAGNOSIS — Z12.11 SCREENING FOR COLORECTAL CANCER: ICD-10-CM

## 2025-05-12 DIAGNOSIS — Z12.11 SCREEN FOR COLON CANCER: ICD-10-CM

## 2025-05-12 DIAGNOSIS — Z12.12 SCREENING FOR COLORECTAL CANCER: ICD-10-CM

## 2025-05-12 PROCEDURE — 7100000010 HC PHASE II RECOVERY - FIRST 15 MIN: Performed by: INTERNAL MEDICINE

## 2025-05-12 PROCEDURE — 7100000011 HC PHASE II RECOVERY - ADDTL 15 MIN: Performed by: INTERNAL MEDICINE

## 2025-05-12 PROCEDURE — 45380 COLONOSCOPY AND BIOPSY: CPT | Performed by: INTERNAL MEDICINE

## 2025-05-12 PROCEDURE — 2709999900 HC NON-CHARGEABLE SUPPLY: Performed by: INTERNAL MEDICINE

## 2025-05-12 PROCEDURE — 2500000003 HC RX 250 WO HCPCS: Performed by: NURSE ANESTHETIST, CERTIFIED REGISTERED

## 2025-05-12 PROCEDURE — 6360000002 HC RX W HCPCS: Performed by: NURSE ANESTHETIST, CERTIFIED REGISTERED

## 2025-05-12 PROCEDURE — 88305 TISSUE EXAM BY PATHOLOGIST: CPT

## 2025-05-12 PROCEDURE — 3609010300 HC COLONOSCOPY W/BIOPSY SINGLE/MULTIPLE: Performed by: INTERNAL MEDICINE

## 2025-05-12 PROCEDURE — 3700000000 HC ANESTHESIA ATTENDED CARE: Performed by: INTERNAL MEDICINE

## 2025-05-12 PROCEDURE — 3700000001 HC ADD 15 MINUTES (ANESTHESIA): Performed by: INTERNAL MEDICINE

## 2025-05-12 PROCEDURE — 2580000003 HC RX 258: Performed by: ANESTHESIOLOGY

## 2025-05-12 RX ORDER — SODIUM CHLORIDE, SODIUM LACTATE, POTASSIUM CHLORIDE, CALCIUM CHLORIDE 600; 310; 30; 20 MG/100ML; MG/100ML; MG/100ML; MG/100ML
INJECTION, SOLUTION INTRAVENOUS CONTINUOUS
Status: DISCONTINUED | OUTPATIENT
Start: 2025-05-12 | End: 2025-05-12 | Stop reason: HOSPADM

## 2025-05-12 RX ORDER — PROPOFOL 10 MG/ML
INJECTION, EMULSION INTRAVENOUS
Status: DISCONTINUED | OUTPATIENT
Start: 2025-05-12 | End: 2025-05-12 | Stop reason: SDUPTHER

## 2025-05-12 RX ORDER — SODIUM CHLORIDE 0.9 % (FLUSH) 0.9 %
5-40 SYRINGE (ML) INJECTION EVERY 12 HOURS SCHEDULED
Status: DISCONTINUED | OUTPATIENT
Start: 2025-05-12 | End: 2025-05-12 | Stop reason: HOSPADM

## 2025-05-12 RX ORDER — SODIUM CHLORIDE 9 MG/ML
INJECTION, SOLUTION INTRAVENOUS PRN
Status: DISCONTINUED | OUTPATIENT
Start: 2025-05-12 | End: 2025-05-12 | Stop reason: HOSPADM

## 2025-05-12 RX ORDER — SODIUM CHLORIDE 0.9 % (FLUSH) 0.9 %
5-40 SYRINGE (ML) INJECTION PRN
Status: DISCONTINUED | OUTPATIENT
Start: 2025-05-12 | End: 2025-05-12 | Stop reason: HOSPADM

## 2025-05-12 RX ORDER — KETAMINE HCL IN 0.9 % NACL 50 MG/5 ML
SYRINGE (ML) INTRAVENOUS
Status: DISCONTINUED | OUTPATIENT
Start: 2025-05-12 | End: 2025-05-12 | Stop reason: SDUPTHER

## 2025-05-12 RX ORDER — SODIUM CHLORIDE 9 MG/ML
INJECTION, SOLUTION INTRAVENOUS CONTINUOUS
Status: DISCONTINUED | OUTPATIENT
Start: 2025-05-12 | End: 2025-05-12 | Stop reason: HOSPADM

## 2025-05-12 RX ORDER — LIDOCAINE HYDROCHLORIDE 10 MG/ML
1 INJECTION, SOLUTION EPIDURAL; INFILTRATION; INTRACAUDAL; PERINEURAL
Status: DISCONTINUED | OUTPATIENT
Start: 2025-05-13 | End: 2025-05-12 | Stop reason: HOSPADM

## 2025-05-12 RX ADMIN — SODIUM CHLORIDE, SODIUM LACTATE, POTASSIUM CHLORIDE, AND CALCIUM CHLORIDE: .6; .31; .03; .02 INJECTION, SOLUTION INTRAVENOUS at 10:26

## 2025-05-12 RX ADMIN — Medication 25 MG: at 10:47

## 2025-05-12 RX ADMIN — PROPOFOL 125 MCG/KG/MIN: 10 INJECTION, EMULSION INTRAVENOUS at 10:42

## 2025-05-12 ASSESSMENT — ENCOUNTER SYMPTOMS
CHEST TIGHTNESS: 0
SORE THROAT: 0
RHINORRHEA: 0
WHEEZING: 0
BACK PAIN: 1
SHORTNESS OF BREATH: 0
COUGH: 0
EYES NEGATIVE: 1

## 2025-05-12 ASSESSMENT — PAIN - FUNCTIONAL ASSESSMENT: PAIN_FUNCTIONAL_ASSESSMENT: 0-10

## 2025-05-12 NOTE — DISCHARGE INSTR - ACTIVITY
Recommendations/Plan:   Lifestyle and dietary modifications as discussed  F/U Biopsies  F/U In Office in 3-4 weeks  Discussed with the family  Colonoscopy Recall :7 year  POST SEDATION PATIENT [place]      Colonoscopy: What to Expect at Home  Your Recovery  Your doctor will talk to you about when you will need your next colonoscopy. The results of your test and your risk for colorectal cancer will help your doctor decide how often you need to be checked.  After the test, you may be bloated or have gas pains. You may need to pass gas. If a biopsy was done or a polyp was removed, you may have streaks of blood in your stool (feces) for a few days.    How can you care for yourself at home?  Activity  Rest as much as you need to after you go home.  You should be able to go back to your usual activities the day after the test.  Diet  Follow your doctor’s directions for eating.  Drink plenty of fluids (unless your doctor has told you not to) to replace the fluids that were lost during the colon prep.  Medicines  If polyps were removed or a biopsy was done during the test, your doctor may tell you not to take aspirin or other anti-inflammatory medicines, such as ibuprofen (Advil, Motrin) and naproxen (Aleve), for a few days.  Follow-up care is a key part of your treatment and safety. Be sure to make and go to all appointments, and call your doctor if you are having problems.  When should you call for help?  Call 911 anytime you think you may need emergency care. For example, call if:  You passed out (lost consciousness).  You pass maroon or bloody stools.  You have severe belly pain.  Call your doctor now or seek immediate medical care if:  Your stools are black and tarlike.  Your stools have streaks of blood, but you did not have a biopsy or any polyps removed.  You have belly pain, or your belly is swollen and firm.  You vomit.  You have a fever.  You are very dizzy.  Watch closely for changes in your health, and be sure to

## 2025-05-12 NOTE — OP NOTE
.PROCEDURE NOTE    DATE OF PROCEDURE: 5/12/2025    SURGEON: Vicky Ochoa MD    ASSISTANT: None    PREOPERATIVE DIAGNOSIS:   Pre-Op Diagnosis Codes:      * Screen for colon cancer [Z12.11]     * Screening for colorectal cancer [Z12.11, Z12.12]  POSTOPERATIVE DIAGNOSIS: as described below    OPERATION: Total colonoscopy   COLD BIOPSY POLYPECTOMY    ANESTHESIA: MAC PER ANESTHESIA     ESTIMATED BLOOD LOSS: less than 50     COMPLICATIONS: None.     SPECIMENS:  Was Obtained:     HISTORY: The patient is a 64 y.o. year old female with history of above preop diagnosis.  I recommended colonoscopy with possible biopsy or polypectomy and I explained the risk, benefits, expected outcome, and alternatives to the procedure.  Risks included but are not limited to bleeding, infection, respiratory distress, hypotension, and perforation of the colon and possibility of missing a lesion.  The patient understands and is in agreement.      PROCEDURE: The patient was given IV conscious sedation.  The patient's SPO2 remained above 90% throughout the procedure. The colonoscope was inserted per rectum and advanced under direct vision to the cecum without difficulty.  The prep was good.    SMALL AMOUNT OF PASTY STOOLS   Findings:  Terminal ileum: normal    Cecum/Ascending colon: abnormal: A DIMINUTIVE POLYP WAS EXCISED WITH JUMBO BIOPSY FORCEPS FROM THE CECUM    Transverse colon: normal    Descending/Sigmoid colon: abnormal: FEW SCATTERED DIVERTICULI    Rectum/Anus: examined in normal and retroflexed positions and was abnormal: INT HEMORRHOIDS WITH SKIN TAG    Withdrawal Time was (minutes): 10    The colon was decompressed and the scope was removed.  The patient tolerated the procedure well.     Recommendations/Plan:   Lifestyle and dietary modifications as discussed  F/U Biopsies  F/U In Office in 3-4 weeks  Discussed with the family  Colonoscopy Recall :7 year  POST SEDATION PATIENT WAS STABLE WITH STABLE VITAL SIGNS AND OXYGEN SATURATIONS

## 2025-05-12 NOTE — ANESTHESIA PRE PROCEDURE
Department of Anesthesiology  Preprocedure Note       Name:  Micheline Arguelles   Age:  64 y.o.  :  1960                                          MRN:  [id number]         Date:  2025      Surgeon: Surgeon(s):  Vicky Ochoa MD    Procedure: Procedure(s):  COLORECTAL CANCER SCREENING, NOT HIGH RISK    Medications prior to admission:   Prior to Admission medications    Medication Sig Start Date End Date Taking? Authorizing Provider   diclofenac (VOLTAREN) 75 MG EC tablet Take 1 tablet by mouth 2 times daily 3/31/25  Yes Gigi Garza DO   vitamin D (ERGOCALCIFEROL) 1.25 MG (76865 UT) CAPS capsule TAKE 1 CAPSULE BY MOUTH ONCE WEEKLY 3/5/25  Yes Seymour Corado APRN - CNP   polyethylene glycol (GLYCOLAX) 17 GM/SCOOP powder Follow instructions given by provider 10/25/24  Yes Vicky Ochoa MD   bisacodyl 5 MG EC tablet Use as instructed from your physicians office for your colonoscopy prep. 10/25/24  Yes Vicky Ochoa MD   bumetanide (BUMEX) 1 MG tablet  24  Yes ProviderJack MD   amiodarone (CORDARONE) 200 MG tablet Take 0.5 tablets by mouth 2 times daily 24  Yes ProviderJack MD   pantoprazole (PROTONIX) 40 MG tablet Take 1 tablet by mouth every morning (before breakfast) 22  Yes ProviderJack MD   carvedilol (COREG) 6.25 MG tablet TAKE 1 TABLET TWICE A DAY WITH  FOOD Orally Twice a day for 90 days   Yes ProviderJack MD   nystatin-triamcinolone (MYCOLOG II) 105678-6.1 UNIT/GM-% cream Apply sparingly bid 22  Yes Komal Mederos APRN - CNP   oxybutynin (DITROPAN-XL) 10 MG extended release tablet Take 1 tablet by mouth 2 times daily 2/10/17  Yes Renaldo Scales MD   hydrochlorothiazide (HYDRODIURIL) 25 MG tablet 1 tablet daily 16  Yes ProviderJack MD   levothyroxine (SYNTHROID) 25 MCG tablet Take 1 tablet by mouth Daily   Yes ProviderJack MD   alendronate (FOSAMAX) 70 MG tablet TAKE 1 TABLET BY MOUTH WEEKLY  WITH 8 OZ OF PLAIN

## 2025-05-12 NOTE — H&P
History and Physical Service   [facility]    HISTORY AND PHYSICAL EXAMINATION            Date of Evaluation: 5/12/2025  Patient name:  Mciheline Arguelles  MRN:   [id number]  YOB: 1960  PCP:    Seymour Corado APRN - CNP    History Obtained From:     Patient, medical records    History of Present Illness:     This is Micheline Arguelles a 64 y.o. female who presents today for a COLORECTAL CANCER SCREENING, NOT HIGH RISK by Vicky Ochoa MD for Screen for colon cancer; Screening for colorectal cancer. Patient denies bowel changes. She denies bloody tarry stools, diarrhea alternating with constipation, nausea, vomiting, abdominal pain or unintentional weight loss. Patient followed bowel prep until watery clear. Has not had previous colonoscopy. No FH colon cancer or polyps. Denies fever, chills, shortness of breath, cough, congestion, wheezing, chest pain, open sores or wounds. Denies hx of diabetes. Last aspirin and eliquis dose 05/05/2025.    Patient received cardiac clearance and can be found within the media tab of [employer].    Past Medical History:     Past Medical History:   Diagnosis Date    Adjustment disorder with mixed anxiety and depressed mood 3/6/2015    Arthritis     BILATERAL KNEES AND HANDS    Atrial fibrillation (HCC) 1/2015    \"stress induced\"  (Dr. Chahal)    Cataracts, bilateral     Depression 7/31/2017    H/O vitamin D deficiency     Hyperlipidemia     Hypertension     Thyroid disease     Urinary incontinence     Urinary urgency     Wears glasses     READING        Past Surgical History:     Past Surgical History:   Procedure Laterality Date    APPENDECTOMY  1988    CYSTOMETROGRAM N/A 4/6/2017    VIDEOURDYNAMICS performed by Renaldo Scales MD at [facility] OR    CYSTOSCOPY      with urodynamics    CYSTOSCOPY  4/6/2017    CYSTOSCOPY performed by Renaldo Scales MD at [facility] OR    DILATION AND CURETTAGE OF UTERUS  2009    EYE SURGERY Bilateral 02/2016    CATARACTS

## 2025-05-12 NOTE — ANESTHESIA POSTPROCEDURE EVALUATION
Department of Anesthesiology  Postprocedure Note    Patient: Micheline Arguelles  MRN: [id number]  YOB: 1960  Date of evaluation: 5/12/2025    Procedure Summary       Date: 05/12/25 Room / Location: [address]    Anesthesia Start: 1039 Anesthesia Stop: 1109    Procedure: COLONOSCOPY WITH POLYP (Rectum) Diagnosis:       Screen for colon cancer      Screening for colorectal cancer      (Screen for colon cancer [Z12.11])      (Screening for colorectal cancer [Z12.11, Z12.12])    Surgeons: Vicky Ochoa MD Responsible Provider: Patricia Rey MD    Anesthesia Type: MAC ASA Status: 3            Anesthesia Type: No value filed.    George Phase I: George Score: 10    George Phase II: George Score: 10    Anesthesia Post Evaluation    Patient location during evaluation: PACU  Patient participation: complete - patient participated  Level of consciousness: awake and alert  Airway patency: patent  Nausea & Vomiting: no nausea and no vomiting  Cardiovascular status: hemodynamically stable  Respiratory status: acceptable  Hydration status: euvolemic  Pain management: adequate    No notable events documented.

## 2025-05-13 ENCOUNTER — APPOINTMENT (OUTPATIENT)
Dept: PHYSICAL THERAPY | Age: 65
End: 2025-05-13
Attending: STUDENT IN AN ORGANIZED HEALTH CARE EDUCATION/TRAINING PROGRAM
Payer: MEDICARE

## 2025-05-15 ENCOUNTER — HOSPITAL ENCOUNTER (OUTPATIENT)
Dept: PHYSICAL THERAPY | Age: 65
Setting detail: THERAPIES SERIES
Discharge: HOME | End: 2025-05-15
Attending: STUDENT IN AN ORGANIZED HEALTH CARE EDUCATION/TRAINING PROGRAM
Payer: MEDICARE

## 2025-05-15 LAB — SURGICAL PATHOLOGY REPORT: NORMAL

## 2025-05-15 NOTE — FLOWSHEET NOTE
[facility]   Outpatient Rehabilitation & Therapy  [address]  P: [phone]   F: [phone]     Physical Therapy Cancel/No Show note    Date: 5/15/2025  Patient: Micheline Arguelles  : 1960  MRN: [id number]    Visit Count:   Cancels/No Shows to date: 20    For today's appointment patient:    [x]  Cancelled    [] Rescheduled appointment    [] No-show     Reason given by patient:    []  Patient ill    []  Conflicting appointment    [] No transportation      [] Conflict with work    [] No reason given    [] Weather related    [] COVID-19    [x] Other:      Comments:  Per , pt called to cx dude to family obligations.        [x] Next appointment was confirmed and rescheduled for tomorrow 25    Electronically signed by: Yue Guajardo PTA

## 2025-05-16 ENCOUNTER — HOSPITAL ENCOUNTER (OUTPATIENT)
Dept: PHYSICAL THERAPY | Age: 65
Setting detail: THERAPIES SERIES
Discharge: HOME | End: 2025-05-16
Attending: STUDENT IN AN ORGANIZED HEALTH CARE EDUCATION/TRAINING PROGRAM
Payer: MEDICARE

## 2025-05-16 PROCEDURE — 97110 THERAPEUTIC EXERCISES: CPT

## 2025-05-16 NOTE — FLOWSHEET NOTE
[facility]   Outpatient Rehabilitation & Therapy  [address]  P: [phone]   F: [phone]    Physical Therapy Daily Treatment Note      Date:  2025  Patient Name:  Micheline Arguelles    :  1960  MRN: [id number]  Physician: Griffin Gibson DO                                     Insurance: Medicare / BCBS $0 copay follow medicare guidelines / 6060 visits remaining combined with OT and Speech. HARD MAX.    BENEFITS   Medical Diagnosis: M25.511 (ICD-10-CM) - Right shoulder pain, unspecified chronicity              Rehab Codes:    M25.611 R shoulder stiffness,    M62.81 Weakness    Onset Date: 3/31/25               Next 's appt: 25  Visit Count:                                 Cancel/No Show: 1/0    Subjective:  Patient reporting she had tingling in her arm after her IV from her colonoscopy.  Patient denies tingling since Tuesday.    Pain:  [] Yes  [x] No Location:  n/a Pain Rating: (0-10 scale) 0/10  Pain altered Tx:  [x] No  [] Yes  Action:  Comments:    Objective:  Today's treatment:  Precautions: None; may need cues to stay on task/ not get distracted w/ conversation.  Exercises:  Exercise       Reps/ Time Weight/ Level Completed  today Comments   Neutral posture w/ all ex's!!           Manual           GH, scap-Tx mobs prn  4mins     GH mobs (post, inf, ant)    STM to pec 4'                    Table           Cane flx, abd, ER  10x2 5\"  1# jeremias    TC needed for proper execution  /6 ABD and ER    scapular protraction 10x 2  1#  5/6 added dumbbell   Scapular shapes cw/ccw 15x ea  1#  Added 4/15   5/6 added dumbbell   Shoulder Flexion  10x 1#             Sidelying       Shouder ABD 10x2 1#  5/8 added   Shoulder ER 10x2 1#  5/8 added          Seated           Postural educ Intro/educ        Retro arm bike  2 mins L2  Increased resistance    Pulleys - flx, abd, H abd/add, scaption 2'/2'   x 5/6 flexion and ABD   Cerv. retractions  10x5\"

## 2025-05-17 PROBLEM — Z12.12 SCREENING FOR COLORECTAL CANCER: Status: RESOLVED | Noted: 2025-04-17 | Resolved: 2025-05-17

## 2025-05-17 PROBLEM — Z12.11 SCREEN FOR COLON CANCER: Status: RESOLVED | Noted: 2025-04-17 | Resolved: 2025-05-17

## 2025-05-17 PROBLEM — Z12.11 SCREENING FOR COLORECTAL CANCER: Status: RESOLVED | Noted: 2025-04-17 | Resolved: 2025-05-17

## 2025-05-19 ENCOUNTER — HOSPITAL ENCOUNTER (OUTPATIENT)
Dept: PHYSICAL THERAPY | Age: 65
Setting detail: THERAPIES SERIES
Discharge: HOME | End: 2025-05-19
Attending: STUDENT IN AN ORGANIZED HEALTH CARE EDUCATION/TRAINING PROGRAM
Payer: MEDICARE

## 2025-05-19 PROCEDURE — 97110 THERAPEUTIC EXERCISES: CPT

## 2025-05-19 NOTE — FLOWSHEET NOTE
[facility]   Outpatient Rehabilitation & Therapy  [address]  P: [phone]   F: [phone]    Physical Therapy Daily Treatment Note      Date:  2025  Patient Name:  Mihceline Arguelles    :  1960  MRN: [id number]  Physician: Griffin Gibson DO                                     Insurance: Medicare / BCBS $0 copay follow medicare guidelines / 60/60 visits remaining combined with OT and Speech. HARD MAX.    BENEFITS   Medical Diagnosis: M25.511 (ICD-10-CM) - Right shoulder pain, unspecified chronicity              Rehab Codes:    M25.611 R shoulder stiffness,    M62.81 Weakness    Onset Date: 3/31/25               Next 's appt: 25  Visit Count:                                 Cancel/No Show: 1/0    Subjective:  Pt reports 2-3/10 pain at start of session, attributes increased pain to increased activity over weekend. Pt notes overall compliance with HEP, however notes not completing HEP over weekend due to having \"the grand baby\". Pt notes working to improve postural awareness.   Pain:  [] Yes  [x] No Location:  n/a Pain Rating: (0-10 scale) 2-3/10  Pain altered Tx:  [x] No  [] Yes  Action:  Comments:    Objective:  Today's treatment:  Precautions: None; may need cues to stay on task/ not get distracted w/ conversation.  Exercises:  Exercise       Reps/ Time Weight/ Level Completed  today Comments   Neutral posture w/ all ex's!!           Manual           GH, scap-Tx mobs prn  4mins     GH mobs (post, inf, ant)    STM to pec 4'                    Table           Cane flx, abd, ER  10x2 5\"  1# jeremias    TC needed for proper execution  6 ABD and ER    scapular protraction 10x 2  1#  5/6 added dumbbell   Scapular shapes cw/ccw 15x ea  1#  Added 4/15   5/6 added dumbbell   Shoulder Flexion  10x 1#             Sidelying       Shouder ABD 10x2 1#  5/8 added   Shoulder ER 10x2 1#  5/8 added          Seated           Postural educ Intro/educ        Retro arm

## 2025-05-22 ENCOUNTER — HOSPITAL ENCOUNTER (OUTPATIENT)
Dept: PHYSICAL THERAPY | Age: 65
Setting detail: THERAPIES SERIES
Discharge: HOME | End: 2025-05-22
Attending: STUDENT IN AN ORGANIZED HEALTH CARE EDUCATION/TRAINING PROGRAM
Payer: MEDICARE

## 2025-05-22 PROCEDURE — 97112 NEUROMUSCULAR REEDUCATION: CPT

## 2025-05-22 PROCEDURE — 97110 THERAPEUTIC EXERCISES: CPT

## 2025-05-22 NOTE — PROGRESS NOTES
Standing Shoulder Internal Rotation Stretch with Towel  - 1 x daily - 7 x weekly - 1-2 sets - 5 reps - 10 seconds hold  - Standing Cross Body Shoulder Stretch at Wall  - 1 x daily - 3-4 x weekly - 1-2 sets - 10-15 reps - 1-2 seconds hold  - Mid-Trap pull aparts  - 1 x daily - 3-4 x weekly - 1-2 sets - 10-15 reps - 1-2 seconds hold    Plan: [x] Continue per plan of care.   [x] Other: Work towards indep. HEP for long term maintenance/progress, w/ likely d/c 6/5/25 if continues w/ progress; pt. In agreement.      Treatment Charges: Mins Units Time in/Out   []  Modalities      [x]  Ther Exercise 36 2 931-948  955-1014 am   []  Manual Therapy      []  Ther Activities      []  Aquatics      [x]  Neuromuscular 7 1 948-955 am   [] Vasocompression      [] Gait Training      [] Dry needling        [] 1 or 2 muscles        [] 3 or more muscles      []  Other      Total Billable time 38 3      Time In: 931 am Time Out: 1014 am    Electronically signed by:  Sue Waters, PT

## 2025-05-28 ENCOUNTER — TELEPHONE (OUTPATIENT)
Dept: GASTROENTEROLOGY | Age: 65
End: 2025-05-28

## 2025-05-28 NOTE — TELEPHONE ENCOUNTER
Patient called to schedule a 2 week follow up for biopsy results from colonoscopy  Patient is fine for appointment whennever  Please return call  Thank you

## 2025-05-29 ENCOUNTER — HOSPITAL ENCOUNTER (OUTPATIENT)
Dept: PHYSICAL THERAPY | Age: 65
Setting detail: THERAPIES SERIES
Discharge: HOME | End: 2025-05-29
Attending: STUDENT IN AN ORGANIZED HEALTH CARE EDUCATION/TRAINING PROGRAM
Payer: MEDICARE

## 2025-05-29 PROCEDURE — 97112 NEUROMUSCULAR REEDUCATION: CPT

## 2025-05-29 PROCEDURE — 97110 THERAPEUTIC EXERCISES: CPT

## 2025-05-29 NOTE — FLOWSHEET NOTE
Rotation Stretch with Towel  - 1 x daily - 7 x weekly - 1-2 sets - 5 reps - 10 seconds hold  - Standing Cross Body Shoulder Stretch at Wall  - 1 x daily - 3-4 x weekly - 1-2 sets - 10-15 reps - 1-2 seconds hold  - Mid-Trap pull aparts  - 1 x daily - 3-4 x weekly - 1-2 sets - 10-15 reps - 1-2 seconds hold    Plan: [x] Continue per plan of care.   [x] Other: See specifics for next treatment and general outline for treatment above.      Treatment Charges: Mins Units Time in/Out   []  Modalities      [x]  Ther Exercise 37 2 [id number]  6573-1106 am   []  Manual Therapy      []  Ther Activities      []  Aquatics      [x]  Neuromuscular 8 1 2422-8403 am   [] Vasocompression      [] Gait Training      [] Dry needling        [] 1 or 2 muscles        [] 3 or more muscles      []  Other      Total Billable time 45 3      Time In: 1010 am Time Out: 1059 am    Electronically signed by:  Sue Waters PT

## 2025-06-02 ENCOUNTER — TELEPHONE (OUTPATIENT)
Dept: ORTHOPEDIC SURGERY | Age: 65
End: 2025-06-02

## 2025-06-02 NOTE — TELEPHONE ENCOUNTER
Patient is schedule for PT on 6/3/25 and 6/5/25, stated she only have two sessions left and want to know if Dr. Gibson want there to complete the last two sessions because she doing the home exercises.      Next visit 6/30/25

## 2025-06-04 ENCOUNTER — HOSPITAL ENCOUNTER (OUTPATIENT)
Dept: PHYSICAL THERAPY | Age: 65
Setting detail: THERAPIES SERIES
Discharge: HOME | End: 2025-06-04
Attending: STUDENT IN AN ORGANIZED HEALTH CARE EDUCATION/TRAINING PROGRAM

## 2025-06-04 NOTE — FLOWSHEET NOTE
[facility]   Outpatient Rehabilitation & Therapy  [address]  P: [phone]   F: [phone]     Physical Therapy Cancel/No Show note    Date: 2025  Patient: Micheline Arguelles  : 1960  MRN: [id number]    Visit Count:   Cancels/No Shows to date:     For today's appointment patient:    [x]  Cancelled    [] Rescheduled appointment    [] No-show     Reason given by patient:    []  Patient ill    []  Conflicting appointment    [] No transportation      [] Conflict with work    [] No reason given    [] Weather related    [] COVID-19    [x] Other:   bad back today' r/s for next week   Comments:        [x] Next appointment was confirmed    Electronically signed by: Sue Waters, PT

## 2025-06-05 ENCOUNTER — APPOINTMENT (OUTPATIENT)
Dept: PHYSICAL THERAPY | Age: 65
End: 2025-06-05
Attending: STUDENT IN AN ORGANIZED HEALTH CARE EDUCATION/TRAINING PROGRAM
Payer: MEDICARE

## 2025-06-12 ENCOUNTER — OFFICE VISIT (OUTPATIENT)
Dept: GASTROENTEROLOGY | Age: 65
End: 2025-06-12
Payer: MEDICARE

## 2025-06-12 VITALS
WEIGHT: 287 LBS | DIASTOLIC BLOOD PRESSURE: 66 MMHG | HEART RATE: 63 BPM | SYSTOLIC BLOOD PRESSURE: 144 MMHG | BODY MASS INDEX: 62.11 KG/M2

## 2025-06-12 DIAGNOSIS — Z12.11 SCREEN FOR COLON CANCER: ICD-10-CM

## 2025-06-12 DIAGNOSIS — Z12.12 SCREENING FOR COLORECTAL CANCER: ICD-10-CM

## 2025-06-12 DIAGNOSIS — Z12.11 SCREENING FOR COLORECTAL CANCER: ICD-10-CM

## 2025-06-12 DIAGNOSIS — K63.5 BENIGN COLON POLYP: Primary | ICD-10-CM

## 2025-06-12 DIAGNOSIS — E66.01 MORBID OBESITY (HCC): ICD-10-CM

## 2025-06-12 PROCEDURE — 1036F TOBACCO NON-USER: CPT | Performed by: INTERNAL MEDICINE

## 2025-06-12 PROCEDURE — 3078F DIAST BP <80 MM HG: CPT | Performed by: INTERNAL MEDICINE

## 2025-06-12 PROCEDURE — 3077F SYST BP >= 140 MM HG: CPT | Performed by: INTERNAL MEDICINE

## 2025-06-12 PROCEDURE — 3017F COLORECTAL CA SCREEN DOC REV: CPT | Performed by: INTERNAL MEDICINE

## 2025-06-12 PROCEDURE — G8399 PT W/DXA RESULTS DOCUMENT: HCPCS | Performed by: INTERNAL MEDICINE

## 2025-06-12 PROCEDURE — G8417 CALC BMI ABV UP PARAM F/U: HCPCS | Performed by: INTERNAL MEDICINE

## 2025-06-12 PROCEDURE — 1090F PRES/ABSN URINE INCON ASSESS: CPT | Performed by: INTERNAL MEDICINE

## 2025-06-12 PROCEDURE — 1159F MED LIST DOCD IN RCRD: CPT | Performed by: INTERNAL MEDICINE

## 2025-06-12 PROCEDURE — 99214 OFFICE O/P EST MOD 30 MIN: CPT | Performed by: INTERNAL MEDICINE

## 2025-06-12 PROCEDURE — G8427 DOCREV CUR MEDS BY ELIG CLIN: HCPCS | Performed by: INTERNAL MEDICINE

## 2025-06-12 PROCEDURE — 1123F ACP DISCUSS/DSCN MKR DOCD: CPT | Performed by: INTERNAL MEDICINE

## 2025-06-12 ASSESSMENT — ENCOUNTER SYMPTOMS
SORE THROAT: 0
ABDOMINAL DISTENTION: 0
COUGH: 0
ABDOMINAL PAIN: 0
NAUSEA: 0
TROUBLE SWALLOWING: 0
CHOKING: 0
BLOOD IN STOOL: 0
VOICE CHANGE: 0
SHORTNESS OF BREATH: 0
DIARRHEA: 0
CONSTIPATION: 0
ANAL BLEEDING: 0
WHEEZING: 0
RECTAL PAIN: 0
VOMITING: 0

## 2025-06-12 NOTE — PROGRESS NOTES
GI CLINIC FOLLOW UP    NTERVAL HISTORY:   No referring provider defined for this encounter.    Chief Complaint   Patient presents with    Results     Patient is here today for a f/u from colonoscopy procedure completed on 5/12/25.       1. Benign colon polyp    2. Screen for colon cancer    3. Screening for colorectal cancer    4. Morbid obesity (HCC)       The patient is here as a follow up of her recent GI procedure.   The results have been sent to you separately   The findings were explained to the patient in detail and biopsies were also discussed   with her    She has a benign lymphoid agreggate polyp    Pt is clinically feeling well GI wise  Has No significant abdominal pains, bloating or cramping  Has no sig GERD symptoms  Has no Dysphagia, No Nausea or any vomiting  Denies any rectal bleeding or any melena  Denies any sig constipation or any diarrhea symptoms  Weight is stable and appetite is generally good.        HISTORY OF PRESENT ILLNESS: Ms.Mary LOLLY Arguelles is a 64 y.o. female with a past history remarkable for , referred for evaluation of   Chief Complaint   Patient presents with    Results     Patient is here today for a f/u from colonoscopy procedure completed on 5/12/25.   .    Past Medical,Family, and Social History reviewed and does contribute to the patient presenting condition.    Patient's PMH/PSH,SH,PSYCH Hx, MEDs, ALLERGIES, and ROS were all reviewed and updated in the appropriate sections.    PAST MEDICAL HISTORY:  Past Medical History:   Diagnosis Date    Adjustment disorder with mixed anxiety and depressed mood 03/06/2015    Arthritis     BILATERAL KNEES AND HANDS    Atrial fibrillation (HCC) 01/2015    \"stress induced\"  (Dr. Chahal)    Cataracts, bilateral     Depression 07/31/2017    H/O vitamin D deficiency     Hyperlipidemia     Hypertension     Sleep apnea     Thyroid disease     Urinary incontinence     Urinary urgency     Wears glasses     READING       Past Surgical  Spoke with patient, he does not wish to increase to 5 mg weekly and wants to stay at 2.5 mg weekly because its working good. He was informed one more refill of 2.5 mg weekly will be sent to [facility] in [place] prior to his follow up next month. He was appreciative of the follow up and had no further questions or concerns.      Complex Repair And Flap Additional Text (Will Appearing After The Standard Complex Repair Text): The complex repair was not sufficient to completely close the primary defect. The remaining additional defect was repaired with the flap mentioned below.

## 2025-06-13 ENCOUNTER — HOSPITAL ENCOUNTER (OUTPATIENT)
Dept: PHYSICAL THERAPY | Age: 65
Setting detail: THERAPIES SERIES
Discharge: HOME | End: 2025-06-13
Attending: STUDENT IN AN ORGANIZED HEALTH CARE EDUCATION/TRAINING PROGRAM
Payer: MEDICARE

## 2025-06-13 PROCEDURE — 97110 THERAPEUTIC EXERCISES: CPT

## 2025-06-13 PROCEDURE — 97112 NEUROMUSCULAR REEDUCATION: CPT

## 2025-06-13 NOTE — FLOWSHEET NOTE
[facility]   Outpatient Rehabilitation & Therapy  [address]  P: [phone]   F: [phone]    Physical Therapy Daily Treatment Note     Date:  2025  Patient Name:  Micheline Arguelles    :  1960  MRN: [id number]  Physician: Griffin Gibson DO                                     Insurance: Medicare / BCBS $0 copay follow medicare guidelines /  visits remaining combined with OT and Speech. HARD MAX.    BENEFITS   Medical Diagnosis: M25.511 (ICD-10-CM) - Right shoulder pain, unspecified chronicity              Rehab Codes:    M25.611 R shoulder stiffness,    M62.81 Weakness    Onset Date: 3/31/25               Next 's appt: 25  Visit Count: 15/16                                Cancel/No Show: 1/0    Subjective:    Pain:  [] Yes  [x] No Location:  n/a Pain Rating: (0-10 scale) 0/10  Pain altered Tx:  [x] No  [] Yes  Action:    Comments: Had to r/s follow up w/  d/t \"busy week.\"  Was instructed by ortho office  to complete remaining 2 PT appts. Able to rimma, still limits self to 4 rows at a time to prevent any symptoms. States shoulder is only bothersome if lifts something heavy, 3/10.  Also noted pain w/ waking up to phone call and instinctively reaching for phone behind her.   Did note increasing pain w/ holding baby too long, had to hand her off to spouse. Able to play computer games w/o issue. Overall, states pain is manageable.     Objective:    Today's treatment:  Precautions: None; may need cues to stay on task/ not get distracted w/ conversation.  Exercises:  Exercise       Reps/ Time Weight/ Level Completed  today Comments   Neutral posture w/ all ex's!!           Manual           GH, scap-Tx mobs prn  4mins   --  GH mobs (post, inf, ant)    STM to pec 4'   --                 Table           Cane flx, abd, ER  15x ea  1# jeremias x   TC needed for proper execution  6 ABD and ER    scapular protraction 10x   1# x 5/6 added dumbbell

## 2025-06-24 ENCOUNTER — OFFICE VISIT (OUTPATIENT)
Dept: FAMILY MEDICINE CLINIC | Age: 65
End: 2025-06-24
Payer: MEDICARE

## 2025-06-24 VITALS
DIASTOLIC BLOOD PRESSURE: 62 MMHG | HEART RATE: 55 BPM | SYSTOLIC BLOOD PRESSURE: 111 MMHG | TEMPERATURE: 98.1 F | OXYGEN SATURATION: 98 %

## 2025-06-24 DIAGNOSIS — J06.9 VIRAL URI: Primary | ICD-10-CM

## 2025-06-24 DIAGNOSIS — R06.2 WHEEZING ON EXPIRATION: ICD-10-CM

## 2025-06-24 LAB
INFLUENZA A ANTIGEN, POC: NEGATIVE
INFLUENZA B ANTIGEN, POC: NEGATIVE
LOT EXPIRE DATE: NORMAL
LOT KIT NUMBER: NORMAL
SARS-COV-2, POC: NORMAL
VALID INTERNAL CONTROL: NORMAL
VENDOR AND KIT NAME POC: NORMAL

## 2025-06-24 PROCEDURE — G8417 CALC BMI ABV UP PARAM F/U: HCPCS | Performed by: NURSE PRACTITIONER

## 2025-06-24 PROCEDURE — 1090F PRES/ABSN URINE INCON ASSESS: CPT | Performed by: NURSE PRACTITIONER

## 2025-06-24 PROCEDURE — G8399 PT W/DXA RESULTS DOCUMENT: HCPCS | Performed by: NURSE PRACTITIONER

## 2025-06-24 PROCEDURE — 87428 SARSCOV & INF VIR A&B AG IA: CPT | Performed by: NURSE PRACTITIONER

## 2025-06-24 PROCEDURE — 3078F DIAST BP <80 MM HG: CPT | Performed by: NURSE PRACTITIONER

## 2025-06-24 PROCEDURE — 99213 OFFICE O/P EST LOW 20 MIN: CPT | Performed by: NURSE PRACTITIONER

## 2025-06-24 PROCEDURE — 1036F TOBACCO NON-USER: CPT | Performed by: NURSE PRACTITIONER

## 2025-06-24 PROCEDURE — G8427 DOCREV CUR MEDS BY ELIG CLIN: HCPCS | Performed by: NURSE PRACTITIONER

## 2025-06-24 PROCEDURE — 1159F MED LIST DOCD IN RCRD: CPT | Performed by: NURSE PRACTITIONER

## 2025-06-24 PROCEDURE — 3017F COLORECTAL CA SCREEN DOC REV: CPT | Performed by: NURSE PRACTITIONER

## 2025-06-24 PROCEDURE — 1123F ACP DISCUSS/DSCN MKR DOCD: CPT | Performed by: NURSE PRACTITIONER

## 2025-06-24 PROCEDURE — 3074F SYST BP LT 130 MM HG: CPT | Performed by: NURSE PRACTITIONER

## 2025-06-24 RX ORDER — ALBUTEROL SULFATE 90 UG/1
2 INHALANT RESPIRATORY (INHALATION) EVERY 4 HOURS PRN
Qty: 18 G | Refills: 3 | Status: SHIPPED | OUTPATIENT
Start: 2025-06-24

## 2025-06-24 RX ORDER — GUAIFENESIN 600 MG/1
600 TABLET, EXTENDED RELEASE ORAL 2 TIMES DAILY
Qty: 30 TABLET | Refills: 0 | Status: SHIPPED | OUTPATIENT
Start: 2025-06-24 | End: 2025-07-09

## 2025-06-24 RX ORDER — DEXTROMETHORPHAN POLISTIREX 30 MG/5ML
60 SUSPENSION ORAL 2 TIMES DAILY PRN
Qty: 148 ML | Refills: 0 | Status: SHIPPED | OUTPATIENT
Start: 2025-06-24 | End: 2025-07-04

## 2025-06-24 RX ORDER — FLUTICASONE PROPIONATE 50 MCG
2 SPRAY, SUSPENSION (ML) NASAL DAILY
Qty: 16 G | Refills: 0 | Status: SHIPPED | OUTPATIENT
Start: 2025-06-24

## 2025-06-24 ASSESSMENT — ENCOUNTER SYMPTOMS
RHINORRHEA: 0
CHEST TIGHTNESS: 0
VOICE CHANGE: 0
DIARRHEA: 0
NAUSEA: 0
SINUS PRESSURE: 0
TROUBLE SWALLOWING: 0
WHEEZING: 1
STRIDOR: 0
SINUS PAIN: 0
VOMITING: 0
HEMOPTYSIS: 0
SORE THROAT: 1
COUGH: 1
SHORTNESS OF BREATH: 0
HEARTBURN: 0
CHOKING: 0

## 2025-06-24 NOTE — PROGRESS NOTES
[facility] PHYSICIANS [facility], [facility] WALK-IN FAMILY [facility]ICINE  [address]  [address]  [facility] [address]  Dept: [phone]  Dept Fax: [phone]    Micheline Arguelles is a 65 y.o. female who presents to the urgent care today for her medical conditions/complaints as notedbelow.  Micheline Arguelles is c/o of Cough (Onset since Saturday with sore throat, coughing worse at night, stuffy nose and sneezing. Not taking anything for sx relief. )      HPI:     65-year-old female presents for cough, nasal congestion, sneezing, scratchy sore throat for 3 days.  Symptoms are worse at night when tries to lay down on her back.  No OTCs tried  Spouse ill with same, seen in ER - dx with viral URI  Would like covid/flu  testing    Cough  This is a new problem. The current episode started in the past 7 days (3d). The problem has been gradually worsening. The cough is Non-productive. Associated symptoms include headaches, nasal congestion, a sore throat (scratchy) and wheezing (at times when laying down last night). Pertinent negatives include no chest pain, chills, ear congestion, ear pain, fever, heartburn, hemoptysis, myalgias, postnasal drip, rash, rhinorrhea, shortness of breath, sweats or weight loss. Associated symptoms comments: Sneezing. The symptoms are aggravated by lying down. Treatments tried: hot tea with honey and lemon. The treatment provided mild relief. Her past medical history is significant for bronchitis and environmental allergies. There is no history of asthma, COPD or pneumonia.       Past Medical History:   Diagnosis Date    Adjustment disorder with mixed anxiety and depressed mood 03/06/2015    Arthritis     BILATERAL KNEES AND HANDS    Atrial fibrillation (HCC) 01/2015    \"stress induced\"  (Dr. Chahal)    Cataracts, bilateral     Depression 07/31/2017    H/O vitamin D deficiency     Hyperlipidemia     Hypertension     Sleep apnea     Thyroid disease     Urinary

## 2025-06-26 ENCOUNTER — TELEPHONE (OUTPATIENT)
Dept: FAMILY MEDICINE CLINIC | Age: 65
End: 2025-06-26

## 2025-06-26 ENCOUNTER — APPOINTMENT (OUTPATIENT)
Dept: PHYSICAL THERAPY | Age: 65
End: 2025-06-26
Attending: STUDENT IN AN ORGANIZED HEALTH CARE EDUCATION/TRAINING PROGRAM
Payer: MEDICARE

## 2025-06-26 NOTE — TELEPHONE ENCOUNTER
Patient went to urgent care yesterday they told her that she has a upper respiratory infection she has a very bad cough she wanted to know if she should still be using her CPAP for her sleep apnea since she is coughing so much ?

## 2025-06-30 ENCOUNTER — OFFICE VISIT (OUTPATIENT)
Dept: ORTHOPEDIC SURGERY | Age: 65
End: 2025-06-30
Payer: MEDICARE

## 2025-06-30 VITALS — BODY MASS INDEX: 61.92 KG/M2 | WEIGHT: 287 LBS | HEIGHT: 57 IN

## 2025-06-30 DIAGNOSIS — M25.511 RIGHT SHOULDER PAIN, UNSPECIFIED CHRONICITY: Primary | ICD-10-CM

## 2025-06-30 PROCEDURE — 3017F COLORECTAL CA SCREEN DOC REV: CPT | Performed by: STUDENT IN AN ORGANIZED HEALTH CARE EDUCATION/TRAINING PROGRAM

## 2025-06-30 PROCEDURE — G8417 CALC BMI ABV UP PARAM F/U: HCPCS | Performed by: STUDENT IN AN ORGANIZED HEALTH CARE EDUCATION/TRAINING PROGRAM

## 2025-06-30 PROCEDURE — 1126F AMNT PAIN NOTED NONE PRSNT: CPT | Performed by: STUDENT IN AN ORGANIZED HEALTH CARE EDUCATION/TRAINING PROGRAM

## 2025-06-30 PROCEDURE — 1159F MED LIST DOCD IN RCRD: CPT | Performed by: STUDENT IN AN ORGANIZED HEALTH CARE EDUCATION/TRAINING PROGRAM

## 2025-06-30 PROCEDURE — G8399 PT W/DXA RESULTS DOCUMENT: HCPCS | Performed by: STUDENT IN AN ORGANIZED HEALTH CARE EDUCATION/TRAINING PROGRAM

## 2025-06-30 PROCEDURE — 99213 OFFICE O/P EST LOW 20 MIN: CPT | Performed by: STUDENT IN AN ORGANIZED HEALTH CARE EDUCATION/TRAINING PROGRAM

## 2025-06-30 PROCEDURE — 1123F ACP DISCUSS/DSCN MKR DOCD: CPT | Performed by: STUDENT IN AN ORGANIZED HEALTH CARE EDUCATION/TRAINING PROGRAM

## 2025-06-30 PROCEDURE — G8427 DOCREV CUR MEDS BY ELIG CLIN: HCPCS | Performed by: STUDENT IN AN ORGANIZED HEALTH CARE EDUCATION/TRAINING PROGRAM

## 2025-06-30 PROCEDURE — 1090F PRES/ABSN URINE INCON ASSESS: CPT | Performed by: STUDENT IN AN ORGANIZED HEALTH CARE EDUCATION/TRAINING PROGRAM

## 2025-06-30 PROCEDURE — 1036F TOBACCO NON-USER: CPT | Performed by: STUDENT IN AN ORGANIZED HEALTH CARE EDUCATION/TRAINING PROGRAM

## 2025-06-30 RX ORDER — MELOXICAM 7.5 MG/1
7.5 TABLET ORAL DAILY
Qty: 30 TABLET | Refills: 0 | Status: SHIPPED | OUTPATIENT
Start: 2025-06-30 | End: 2025-07-04

## 2025-06-30 NOTE — PROGRESS NOTES
[facility]  [address]  [place] [address]  Dept Phone: [phone]  Dept Fax: [phone]      [facility] Follow Up      Subjective:     Micheline Arguelles is a 65 y.o. year old female who presents to the clinic today for follow up of her right shoulder.  Patient presents with her significant other.  Patient doing well.  At last encounter in March, she underwent a corticosteroid injection, she still has significant relief.  She also attended therapy, and had great success with that as well.  Denies any numbness or tingling.  No new injuries or falls.  She does complain about some mild buttock pain, but she did have increased activity level with her grandchildren this past weekend    Review of Systems  Gen: no fever, chills, malaise  CV: no chest pain or palpitations  Resp: no cough or shortness of breath  GI: no nausea, vomiting, diarrhea, or constipation  Neuro: no seizures, vertigo, or headache  Msk: Per HPI  10 remaining systems reviewed and negative    Objective :   There were no vitals filed for this visit.Body mass index is 62.11 kg/m².  General: No acute distress, resting comfortably in the clinic  Neuro: alert. oriented  Eyes: Extra-ocular muscles intact  Pulm: Respirations unlabored and regular.  Skin: warm, well perfused  Psych:   Patient has good fund of knowledge and displays understanding of exam, diagnosis, and plan.  Right Upper Extremity: Full range of motion right shoulder without any pain 0-170 degrees in abduction and forward flexion. Compartments soft/compressible. Extremity warm/well perfused. AIN/PIN/Radial/Ulnar/median nerve motor intact. Patient expresses normal sensation C5-T1 distribution     Radiology:  Imaging studies from today were independently reviewed and read as listed below. Any relevant images obtained prior to today's visit were also independently interpreted.      Assessment:   65 y.o. year old female with right shoulder

## 2025-07-04 ENCOUNTER — APPOINTMENT (OUTPATIENT)
Dept: GENERAL RADIOLOGY | Age: 65
End: 2025-07-04
Payer: MEDICARE

## 2025-07-04 ENCOUNTER — HOSPITAL ENCOUNTER (EMERGENCY)
Age: 65
Discharge: HOME | End: 2025-07-04
Attending: EMERGENCY MEDICINE
Payer: MEDICARE

## 2025-07-04 VITALS
OXYGEN SATURATION: 96 % | SYSTOLIC BLOOD PRESSURE: 150 MMHG | BODY MASS INDEX: 61.92 KG/M2 | WEIGHT: 287 LBS | DIASTOLIC BLOOD PRESSURE: 85 MMHG | RESPIRATION RATE: 18 BRPM | HEIGHT: 57 IN | TEMPERATURE: 98.2 F | HEART RATE: 64 BPM

## 2025-07-04 DIAGNOSIS — M25.511 RIGHT SHOULDER PAIN, UNSPECIFIED CHRONICITY: ICD-10-CM

## 2025-07-04 DIAGNOSIS — M25.552 LEFT HIP PAIN: Primary | ICD-10-CM

## 2025-07-04 PROCEDURE — 96372 THER/PROPH/DIAG INJ SC/IM: CPT

## 2025-07-04 PROCEDURE — 99284 EMERGENCY DEPT VISIT MOD MDM: CPT

## 2025-07-04 PROCEDURE — 73502 X-RAY EXAM HIP UNI 2-3 VIEWS: CPT

## 2025-07-04 PROCEDURE — 6360000002 HC RX W HCPCS: Performed by: EMERGENCY MEDICINE

## 2025-07-04 RX ORDER — MELOXICAM 7.5 MG/1
7.5 TABLET ORAL 2 TIMES DAILY
Qty: 30 TABLET | Refills: 0 | Status: SHIPPED | OUTPATIENT
Start: 2025-07-04

## 2025-07-04 RX ORDER — KETOROLAC TROMETHAMINE 30 MG/ML
30 INJECTION, SOLUTION INTRAMUSCULAR; INTRAVENOUS ONCE
Status: COMPLETED | OUTPATIENT
Start: 2025-07-04 | End: 2025-07-04

## 2025-07-04 RX ADMIN — KETOROLAC TROMETHAMINE 30 MG: 30 INJECTION, SOLUTION INTRAMUSCULAR at 00:32

## 2025-07-04 ASSESSMENT — ENCOUNTER SYMPTOMS
DIARRHEA: 0
VOMITING: 0
NAUSEA: 0
ABDOMINAL PAIN: 0
COUGH: 0
CONSTIPATION: 0
SHORTNESS OF BREATH: 0

## 2025-07-04 ASSESSMENT — LIFESTYLE VARIABLES
HOW OFTEN DO YOU HAVE A DRINK CONTAINING ALCOHOL: NEVER
HOW MANY STANDARD DRINKS CONTAINING ALCOHOL DO YOU HAVE ON A TYPICAL DAY: PATIENT DOES NOT DRINK

## 2025-07-04 ASSESSMENT — PAIN - FUNCTIONAL ASSESSMENT
PAIN_FUNCTIONAL_ASSESSMENT: 0-10
PAIN_FUNCTIONAL_ASSESSMENT: 0-10

## 2025-07-04 ASSESSMENT — PAIN DESCRIPTION - LOCATION: LOCATION: HIP

## 2025-07-04 ASSESSMENT — PAIN DESCRIPTION - DESCRIPTORS: DESCRIPTORS: SHOOTING

## 2025-07-04 ASSESSMENT — PAIN SCALES - GENERAL
PAINLEVEL_OUTOF10: 3
PAINLEVEL_OUTOF10: 10

## 2025-07-04 ASSESSMENT — PAIN DESCRIPTION - ORIENTATION: ORIENTATION: LEFT

## 2025-07-04 NOTE — ED PROVIDER NOTES
eMERGENCY dEPARTMENT eNCOUnter      Pt Name: Micheline Arguelles  MRN: [id number]  Birthdate 1960  Date of evaluation: 7/4/25      CHIEF COMPLAINT       Chief Complaint   Patient presents with    Hip Pain     Left         HISTORY OF PRESENT ILLNESS    Micheline Arguelles is a 65 y.o. female who presents complaining of hip pain.  Patient states that she has been having left hip pain for about the past month.  Pain is severe and worse with certain movements.  Patient states that she did talk to her orthopedic doctor about it he examined her but did not do any imaging and then put her on baclofen and Mobic.  Patient states she takes the Mobic once a day as directed but it helps for about 6 hours and then by nighttime she is miserable and is having a hard time sleeping.  Patient denies any injury to the leg.  Patient has no numbness tingling or weakness in the leg.      REVIEW OF SYSTEMS       Review of Systems   Constitutional:  Negative for chills and fever.   Respiratory:  Negative for cough and shortness of breath.    Cardiovascular:  Negative for chest pain and palpitations.   Gastrointestinal:  Negative for abdominal pain, constipation, diarrhea, nausea and vomiting.   Musculoskeletal:         Left hip pain   Neurological:  Negative for dizziness, seizures and headaches.       PAST MEDICAL HISTORY     Past Medical History:   Diagnosis Date    Adjustment disorder with mixed anxiety and depressed mood 03/06/2015    Arthritis     BILATERAL KNEES AND HANDS    Atrial fibrillation (HCC) 01/2015    \"stress induced\"  (Dr. Chahal)    Cataracts, bilateral     Depression 07/31/2017    H/O vitamin D deficiency     Hyperlipidemia     Hypertension     Sleep apnea     Thyroid disease     Urinary incontinence     Urinary urgency     Wears glasses     READING       SURGICAL HISTORY       Past Surgical History:   Procedure Laterality Date    APPENDECTOMY  1988    COLONOSCOPY      COLONOSCOPY N/A 5/12/2025    COLONOSCOPY WITH POLYP  MEDS. STAY UPRIGHT FOR 30  MINS  Qty: 12 tablet, Refills: 3    Comments: Requesting 1 year supply  Associated Diagnoses: Osteopenia, unspecified location      pantoprazole (PROTONIX) 40 MG tablet Take 1 tablet by mouth every morning (before breakfast)      carvedilol (COREG) 6.25 MG tablet TAKE 1 TABLET TWICE A DAY WITH  FOOD Orally Twice a day for 90 days      Handicap Placard MISC by Does not apply route Can't walk greater than 200 feet. Expires in 5 years.  Qty: 1 each, Refills: 0    Associated Diagnoses: Chronic midline low back pain without sciatica; Atrial fibrillation with rapid ventricular response (HCC)      nystatin-triamcinolone (MYCOLOG II) 282725-6.1 UNIT/GM-% cream Apply sparingly bid  Qty: 30 g, Refills: 1    Associated Diagnoses: Skin rash      acetaminophen (TYLENOL) 325 MG tablet Take 2 tablets by mouth as needed for Pain      oxybutynin (DITROPAN-XL) 10 MG extended release tablet Take 1 tablet by mouth 2 times daily  Qty: 60 tablet, Refills: 3    Associated Diagnoses: Mixed stress and urge urinary incontinence; Frequency of micturition      hydrochlorothiazide (HYDRODIURIL) 25 MG tablet 1 tablet daily      apixaban (ELIQUIS) 5 MG TABS tablet Take by mouth 2 times daily      levothyroxine (SYNTHROID) 25 MCG tablet Take 1 tablet by mouth Daily             ALLERGIES     is allergic to environmental/seasonal.    SOCIAL HISTORY      reports that she has never smoked. She has been exposed to tobacco smoke. She has never used smokeless tobacco. She reports that she does not drink alcohol and does not use drugs.    PHYSICAL EXAM     INITIAL VITALS: BP (!) 192/75   Pulse 65   Temp 98.5 °F (36.9 °C)   Resp 18   Ht 1.448 m (4' 9\")   Wt 130.2 kg (287 lb)   SpO2 94%   BMI 62.11 kg/m²      Physical Exam  Vitals and nursing note reviewed.   Constitutional:       General: She is not in acute distress.     Appearance: She is well-developed. She is not diaphoretic.   HENT:      Head: Normocephalic and

## 2025-07-04 NOTE — ED NOTES
Mode of arrival (squad #, walk in, police, etc) : WALK-IN        Chief complaint(s): Left hip pain        Arrival Note (brief scenario, treatment PTA, etc).: Pt arrived to the ED with c/o left hip pain that has been going on for a month now. Pt states she saw her othro surgeon for her shoulder and mentioned the hip and he prescribed Mobic on the 30th. Pt states that it is not helping her pain. Pt states she has tried OTC pain medications without relief. Pt states that this feels worse than her arthritis that she already has. Pt denies injury.

## 2025-07-07 ENCOUNTER — TELEPHONE (OUTPATIENT)
Dept: FAMILY MEDICINE CLINIC | Age: 65
End: 2025-07-07

## 2025-07-07 NOTE — TELEPHONE ENCOUNTER
[facility] ED Follow up Call    Reason for ED visit:   Hip Pain     7/7/2025           FU appts/Provider:    Future Appointments   Date Time Provider Department Center   10/17/2025  9:30 AM Seymour Corado APRN - CNP fp sc [facility] DEP         Unable to reach pt no answer voicemail full

## 2025-07-12 PROBLEM — Z12.12 SCREENING FOR COLORECTAL CANCER: Status: RESOLVED | Noted: 2024-08-27 | Resolved: 2025-07-12

## 2025-07-12 PROBLEM — Z12.11 SCREENING FOR COLORECTAL CANCER: Status: RESOLVED | Noted: 2024-08-27 | Resolved: 2025-07-12

## 2025-07-12 PROBLEM — Z12.11 SCREEN FOR COLON CANCER: Status: RESOLVED | Noted: 2025-04-17 | Resolved: 2025-07-12

## 2025-07-18 ENCOUNTER — HOSPITAL ENCOUNTER (OUTPATIENT)
Age: 65
Discharge: HOME | End: 2025-07-18
Payer: MEDICARE

## 2025-07-18 DIAGNOSIS — N18.9 CHRONIC KIDNEY DISEASE, UNSPECIFIED CKD STAGE: ICD-10-CM

## 2025-07-18 DIAGNOSIS — E03.9 HYPOTHYROIDISM, UNSPECIFIED TYPE: ICD-10-CM

## 2025-07-18 DIAGNOSIS — R79.0 LOW MAGNESIUM LEVEL: ICD-10-CM

## 2025-07-18 DIAGNOSIS — E55.9 VITAMIN D DEFICIENCY: ICD-10-CM

## 2025-07-18 DIAGNOSIS — R73.9 HYPERGLYCEMIA: ICD-10-CM

## 2025-07-18 LAB
25(OH)D3 SERPL-MCNC: 53.2 NG/ML (ref 30–100)
ANION GAP SERPL CALCULATED.3IONS-SCNC: 10 MMOL/L (ref 9–16)
BUN SERPL-MCNC: 13 MG/DL (ref 8–23)
CALCIUM SERPL-MCNC: 8.7 MG/DL (ref 8.6–10.4)
CHLORIDE SERPL-SCNC: 106 MMOL/L (ref 98–107)
CO2 SERPL-SCNC: 25 MMOL/L (ref 20–31)
CREAT SERPL-MCNC: 1 MG/DL (ref 0.7–1.2)
EST. AVERAGE GLUCOSE BLD GHB EST-MCNC: 94 MG/DL
GFR, ESTIMATED: 62 ML/MIN/1.73M2
GLUCOSE SERPL-MCNC: 95 MG/DL (ref 74–99)
HBA1C MFR BLD: 4.9 % (ref 4–6)
MAGNESIUM SERPL-MCNC: 2.3 MG/DL (ref 1.6–2.4)
POTASSIUM SERPL-SCNC: 4.6 MMOL/L (ref 3.7–5.3)
SODIUM SERPL-SCNC: 141 MMOL/L (ref 136–145)
T4 FREE SERPL-MCNC: 1.7 NG/DL (ref 0.9–1.7)
TSH SERPL DL<=0.05 MIU/L-ACNC: 1.71 UIU/ML (ref 0.27–4.2)

## 2025-07-18 PROCEDURE — 84439 ASSAY OF FREE THYROXINE: CPT

## 2025-07-18 PROCEDURE — 83735 ASSAY OF MAGNESIUM: CPT

## 2025-07-18 PROCEDURE — 80048 BASIC METABOLIC PNL TOTAL CA: CPT

## 2025-07-18 PROCEDURE — 84443 ASSAY THYROID STIM HORMONE: CPT

## 2025-07-18 PROCEDURE — 82306 VITAMIN D 25 HYDROXY: CPT

## 2025-07-18 PROCEDURE — 36415 COLL VENOUS BLD VENIPUNCTURE: CPT

## 2025-07-18 PROCEDURE — 83036 HEMOGLOBIN GLYCOSYLATED A1C: CPT

## 2025-08-09 DIAGNOSIS — M85.80 OSTEOPENIA, UNSPECIFIED LOCATION: ICD-10-CM

## 2025-08-10 DIAGNOSIS — J06.9 VIRAL URI: ICD-10-CM

## 2025-08-10 RX ORDER — FLUTICASONE PROPIONATE 50 MCG
SPRAY, SUSPENSION (ML) NASAL
Qty: 16 ML | OUTPATIENT
Start: 2025-08-10

## 2025-08-11 DIAGNOSIS — E55.9 VITAMIN D DEFICIENCY: ICD-10-CM

## 2025-08-11 RX ORDER — ALENDRONATE SODIUM 70 MG/1
TABLET ORAL
Qty: 12 TABLET | Refills: 3 | Status: SHIPPED | OUTPATIENT
Start: 2025-08-11

## 2025-08-11 RX ORDER — ERGOCALCIFEROL 1.25 MG/1
50000 CAPSULE, LIQUID FILLED ORAL WEEKLY
Qty: 12 CAPSULE | Refills: 1 | Status: SHIPPED | OUTPATIENT
Start: 2025-08-11

## (undated) DEVICE — FORCEPS BX L240CM JAW DIA2.8MM L CAP W/ NDL MIC MESH TOOTH

## (undated) DEVICE — PRESSURE TRANSMISSION TUBING-CLEAR: Brand: PTTA-C

## (undated) DEVICE — GLOVE ORANGE PI 8   MSG9080

## (undated) DEVICE — DUP USE 240185 SOLUTION IV IRRIG WATER 1000ML IRRIG BAG 2B7114

## (undated) DEVICE — FORCEPS BX OVL CUP SERR DISP CAP L 240CM RAD JAW 4

## (undated) DEVICE — Device: Brand: AIR-CHARGED SINGLE SENSOR COUDÉ CATHETER

## (undated) DEVICE — Device

## (undated) DEVICE — Z DISCONTINUED USE 2270995 CATHETER URETH 16FR L16IN RED RUB INTMIT RND HLLW TIP 2 OPP

## (undated) DEVICE — Device: Brand: INFUSION SET FOR PUMP

## (undated) DEVICE — GLOVE SURG 8 11.7IN BEAD CUF LIGHT BRN SENSICARE LTX FREE

## (undated) DEVICE — Device: Brand: AIR-CHARGED SINGLE SENSOR CATHETER

## (undated) DEVICE — ELECTRODE L SURF PREGELLED

## (undated) DEVICE — CATHETER URODYN AIR CHARGED ABD SENSOR

## (undated) DEVICE — DISCONTINUED USE 148147 APPLICATOR PVP IODINE ST LF

## (undated) DEVICE — FORCEPS BX L240CM JAW DIA2.2MM RAD JAW 4 HOT DISP

## (undated) DEVICE — STAZ ENDO KIT: Brand: MEDLINE INDUSTRIES, INC.